# Patient Record
Sex: MALE | Race: WHITE | NOT HISPANIC OR LATINO | URBAN - METROPOLITAN AREA
[De-identification: names, ages, dates, MRNs, and addresses within clinical notes are randomized per-mention and may not be internally consistent; named-entity substitution may affect disease eponyms.]

---

## 2017-01-24 ENCOUNTER — EMERGENCY (EMERGENCY)
Facility: HOSPITAL | Age: 32
LOS: 1 days | Discharge: HOME | End: 2017-01-24
Attending: EMERGENCY MEDICINE | Admitting: EMERGENCY MEDICINE
Payer: SELF-PAY

## 2017-01-24 VITALS
OXYGEN SATURATION: 97 % | HEIGHT: 66 IN | DIASTOLIC BLOOD PRESSURE: 73 MMHG | TEMPERATURE: 98 F | WEIGHT: 178.57 LBS | SYSTOLIC BLOOD PRESSURE: 119 MMHG | RESPIRATION RATE: 17 BRPM | HEART RATE: 76 BPM

## 2017-01-24 DIAGNOSIS — S49.91XA UNSPECIFIED INJURY OF RIGHT SHOULDER AND UPPER ARM, INITIAL ENCOUNTER: ICD-10-CM
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
## 2017-01-24 PROCEDURE — 73030 X-RAY EXAM OF SHOULDER: CPT | Mod: 26,RT

## 2017-01-24 PROCEDURE — 99284 EMERGENCY DEPT VISIT MOD MDM: CPT

## 2017-01-24 PROCEDURE — 71020: CPT | Mod: 26

## 2017-01-24 PROCEDURE — 71046 X-RAY EXAM CHEST 2 VIEWS: CPT

## 2017-01-24 PROCEDURE — 73030 X-RAY EXAM OF SHOULDER: CPT

## 2017-01-24 NOTE — ED PROCEDURE NOTE - NS ED PERI NEURO NEG
Pre-application: Motor, sensory, and vascular responses intact in the injured extremity./Post-application: Motor, sensory, and vascular responses intact in the injured extremity./The patient/caregiver verbalized understanding of how to care for the injured extremity with splint

## 2017-01-24 NOTE — ED PROVIDER NOTE - OBJECTIVE STATEMENT
30 yo M p/w ~ 1 week ago at work was doing a lot of heavy lifting, then co R shoulder / axilla pain, worse with movement of arm. No fall / direct trauma. no numb/ting/focal weak. no redness/ swelling. no fever/chills. no neck / back pain. No other inj or co. No recent cp/sob/palp / arellano.

## 2017-01-24 NOTE — ED PROVIDER NOTE - PHYSICAL EXAMINATION
R shoulder: pos pain with forced abduction of shoulder. Pos tend to axilla and ant lat R chest wall. No erythema. No swelling. Nl dist str/sens equal bl, 2+ pulses. Nl cap refill. FROM passive, from active with mild pain  No other signs of trauma.

## 2017-01-24 NOTE — ED PROCEDURE NOTE - NS ED PERI VASCULAR NEG
no paresthesia/fingers/toes warm to touch/no cyanosis of extremity/capillary refill time < 2 seconds/no swelling

## 2017-08-11 ENCOUNTER — EMERGENCY (EMERGENCY)
Facility: HOSPITAL | Age: 32
LOS: 1 days | Discharge: HOME | End: 2017-08-11
Attending: INTERNAL MEDICINE | Admitting: INTERNAL MEDICINE
Payer: SELF-PAY

## 2017-08-11 VITALS
HEIGHT: 66 IN | DIASTOLIC BLOOD PRESSURE: 85 MMHG | OXYGEN SATURATION: 97 % | TEMPERATURE: 98 F | SYSTOLIC BLOOD PRESSURE: 126 MMHG | RESPIRATION RATE: 13 BRPM | HEART RATE: 89 BPM | WEIGHT: 195.11 LBS

## 2017-08-11 DIAGNOSIS — S61.011A LACERATION WITHOUT FOREIGN BODY OF RIGHT THUMB WITHOUT DAMAGE TO NAIL, INITIAL ENCOUNTER: ICD-10-CM
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
## 2017-08-11 PROCEDURE — 99283 EMERGENCY DEPT VISIT LOW MDM: CPT | Mod: 25

## 2017-08-11 PROCEDURE — 90471 IMMUNIZATION ADMIN: CPT

## 2017-08-11 PROCEDURE — 99053 MED SERV 10PM-8AM 24 HR FAC: CPT

## 2017-08-11 PROCEDURE — 90715 TDAP VACCINE 7 YRS/> IM: CPT

## 2017-08-11 RX ORDER — TETANUS TOXOID, REDUCED DIPHTHERIA TOXOID AND ACELLULAR PERTUSSIS VACCINE, ADSORBED 5; 2.5; 8; 8; 2.5 [IU]/.5ML; [IU]/.5ML; UG/.5ML; UG/.5ML; UG/.5ML
0.5 SUSPENSION INTRAMUSCULAR ONCE
Qty: 0 | Refills: 0 | Status: COMPLETED | OUTPATIENT
Start: 2017-08-11 | End: 2017-08-11

## 2017-08-11 RX ADMIN — TETANUS TOXOID, REDUCED DIPHTHERIA TOXOID AND ACELLULAR PERTUSSIS VACCINE, ADSORBED 0.5 MILLILITER(S): 5; 2.5; 8; 8; 2.5 SUSPENSION INTRAMUSCULAR at 05:29

## 2017-09-12 ENCOUNTER — EMERGENCY (EMERGENCY)
Facility: HOSPITAL | Age: 32
LOS: 1 days | Discharge: HOME | End: 2017-09-12
Attending: EMERGENCY MEDICINE | Admitting: EMERGENCY MEDICINE
Payer: SELF-PAY

## 2017-09-12 VITALS — WEIGHT: 195.11 LBS | HEIGHT: 62 IN

## 2017-09-12 VITALS
TEMPERATURE: 98 F | SYSTOLIC BLOOD PRESSURE: 106 MMHG | HEART RATE: 64 BPM | OXYGEN SATURATION: 100 % | DIASTOLIC BLOOD PRESSURE: 71 MMHG | RESPIRATION RATE: 17 BRPM

## 2017-09-12 DIAGNOSIS — N50.811 RIGHT TESTICULAR PAIN: ICD-10-CM

## 2017-09-12 DIAGNOSIS — N13.5 CROSSING VESSEL AND STRICTURE OF URETER WITHOUT HYDRONEPHROSIS: ICD-10-CM

## 2017-09-12 LAB
ALBUMIN SERPL ELPH-MCNC: 3.9 G/DL — SIGNIFICANT CHANGE UP (ref 3.3–5)
ALP SERPL-CCNC: 66 U/L — SIGNIFICANT CHANGE UP (ref 40–120)
ALT FLD-CCNC: 28 U/L — SIGNIFICANT CHANGE UP (ref 12–78)
ANION GAP SERPL CALC-SCNC: 10 MMOL/L — SIGNIFICANT CHANGE UP (ref 5–17)
APPEARANCE UR: CLEAR — SIGNIFICANT CHANGE UP
AST SERPL-CCNC: 22 U/L — SIGNIFICANT CHANGE UP (ref 15–37)
BACTERIA # UR AUTO: ABNORMAL
BASOPHILS # BLD AUTO: 0.1 K/UL — SIGNIFICANT CHANGE UP (ref 0–0.2)
BASOPHILS NFR BLD AUTO: 1.1 % — SIGNIFICANT CHANGE UP (ref 0–2)
BILIRUB SERPL-MCNC: 0.2 MG/DL — SIGNIFICANT CHANGE UP (ref 0.2–1.2)
BILIRUB UR-MCNC: NEGATIVE — SIGNIFICANT CHANGE UP
BUN SERPL-MCNC: 12 MG/DL — SIGNIFICANT CHANGE UP (ref 7–23)
CALCIUM SERPL-MCNC: 9.3 MG/DL — SIGNIFICANT CHANGE UP (ref 8.5–10.1)
CHLORIDE SERPL-SCNC: 104 MMOL/L — SIGNIFICANT CHANGE UP (ref 96–108)
CO2 SERPL-SCNC: 27 MMOL/L — SIGNIFICANT CHANGE UP (ref 22–31)
COLOR SPEC: YELLOW — SIGNIFICANT CHANGE UP
COMMENT - URINE: SIGNIFICANT CHANGE UP
CREAT SERPL-MCNC: 0.96 MG/DL — SIGNIFICANT CHANGE UP (ref 0.5–1.3)
DIFF PNL FLD: ABNORMAL
EOSINOPHIL # BLD AUTO: 0.1 K/UL — SIGNIFICANT CHANGE UP (ref 0–0.5)
EOSINOPHIL NFR BLD AUTO: 1.2 % — SIGNIFICANT CHANGE UP (ref 0–6)
EPI CELLS # UR: SIGNIFICANT CHANGE UP
GLUCOSE SERPL-MCNC: 88 MG/DL — SIGNIFICANT CHANGE UP (ref 70–99)
GLUCOSE UR QL: NEGATIVE — SIGNIFICANT CHANGE UP
HCT VFR BLD CALC: 43.4 % — SIGNIFICANT CHANGE UP (ref 39–50)
HGB BLD-MCNC: 14.8 G/DL — SIGNIFICANT CHANGE UP (ref 13–17)
KETONES UR-MCNC: NEGATIVE — SIGNIFICANT CHANGE UP
LEUKOCYTE ESTERASE UR-ACNC: NEGATIVE — SIGNIFICANT CHANGE UP
LYMPHOCYTES # BLD AUTO: 3.2 K/UL — SIGNIFICANT CHANGE UP (ref 1–3.3)
LYMPHOCYTES # BLD AUTO: 37.1 % — SIGNIFICANT CHANGE UP (ref 13–44)
MCHC RBC-ENTMCNC: 32.1 PG — SIGNIFICANT CHANGE UP (ref 27–34)
MCHC RBC-ENTMCNC: 34.1 GM/DL — SIGNIFICANT CHANGE UP (ref 32–36)
MCV RBC AUTO: 94.1 FL — SIGNIFICANT CHANGE UP (ref 80–100)
MONOCYTES # BLD AUTO: 0.9 K/UL — SIGNIFICANT CHANGE UP (ref 0–0.9)
MONOCYTES NFR BLD AUTO: 10.1 % — HIGH (ref 1–9)
NEUTROPHILS # BLD AUTO: 4.3 K/UL — SIGNIFICANT CHANGE UP (ref 1.8–7.4)
NEUTROPHILS NFR BLD AUTO: 50.4 % — SIGNIFICANT CHANGE UP (ref 43–77)
NITRITE UR-MCNC: NEGATIVE — SIGNIFICANT CHANGE UP
PH UR: 6.5 — SIGNIFICANT CHANGE UP (ref 5–8)
PLATELET # BLD AUTO: 359 K/UL — SIGNIFICANT CHANGE UP (ref 150–400)
POTASSIUM SERPL-MCNC: 4.2 MMOL/L — SIGNIFICANT CHANGE UP (ref 3.5–5.3)
POTASSIUM SERPL-SCNC: 4.2 MMOL/L — SIGNIFICANT CHANGE UP (ref 3.5–5.3)
PROT SERPL-MCNC: 7.3 G/DL — SIGNIFICANT CHANGE UP (ref 6–8.3)
PROT UR-MCNC: NEGATIVE — SIGNIFICANT CHANGE UP
RBC # BLD: 4.61 M/UL — SIGNIFICANT CHANGE UP (ref 4.2–5.8)
RBC # FLD: 11.6 % — SIGNIFICANT CHANGE UP (ref 10.3–14.5)
RBC CASTS # UR COMP ASSIST: SIGNIFICANT CHANGE UP /HPF (ref 0–4)
SODIUM SERPL-SCNC: 141 MMOL/L — SIGNIFICANT CHANGE UP (ref 135–145)
SP GR SPEC: 1.01 — SIGNIFICANT CHANGE UP (ref 1.01–1.02)
UROBILINOGEN FLD QL: NEGATIVE — SIGNIFICANT CHANGE UP
WBC # BLD: 8.6 K/UL — SIGNIFICANT CHANGE UP (ref 3.8–10.5)
WBC # FLD AUTO: 8.6 K/UL — SIGNIFICANT CHANGE UP (ref 3.8–10.5)
WBC UR QL: SIGNIFICANT CHANGE UP

## 2017-09-12 PROCEDURE — 99284 EMERGENCY DEPT VISIT MOD MDM: CPT | Mod: 25

## 2017-09-12 PROCEDURE — 96361 HYDRATE IV INFUSION ADD-ON: CPT

## 2017-09-12 PROCEDURE — 76870 US EXAM SCROTUM: CPT | Mod: 26

## 2017-09-12 PROCEDURE — 87086 URINE CULTURE/COLONY COUNT: CPT

## 2017-09-12 PROCEDURE — 74177 CT ABD & PELVIS W/CONTRAST: CPT

## 2017-09-12 PROCEDURE — 74177 CT ABD & PELVIS W/CONTRAST: CPT | Mod: 26

## 2017-09-12 PROCEDURE — 99285 EMERGENCY DEPT VISIT HI MDM: CPT | Mod: 25

## 2017-09-12 PROCEDURE — 99053 MED SERV 10PM-8AM 24 HR FAC: CPT

## 2017-09-12 PROCEDURE — 80053 COMPREHEN METABOLIC PANEL: CPT

## 2017-09-12 PROCEDURE — 85027 COMPLETE CBC AUTOMATED: CPT

## 2017-09-12 PROCEDURE — 96375 TX/PRO/DX INJ NEW DRUG ADDON: CPT

## 2017-09-12 PROCEDURE — 96374 THER/PROPH/DIAG INJ IV PUSH: CPT

## 2017-09-12 PROCEDURE — 81001 URINALYSIS AUTO W/SCOPE: CPT

## 2017-09-12 PROCEDURE — 76870 US EXAM SCROTUM: CPT

## 2017-09-12 PROCEDURE — 96376 TX/PRO/DX INJ SAME DRUG ADON: CPT

## 2017-09-12 RX ORDER — MORPHINE SULFATE 50 MG/1
4 CAPSULE, EXTENDED RELEASE ORAL ONCE
Qty: 0 | Refills: 0 | Status: DISCONTINUED | OUTPATIENT
Start: 2017-09-12 | End: 2017-09-12

## 2017-09-12 RX ORDER — SODIUM CHLORIDE 9 MG/ML
1000 INJECTION INTRAMUSCULAR; INTRAVENOUS; SUBCUTANEOUS ONCE
Qty: 0 | Refills: 0 | Status: COMPLETED | OUTPATIENT
Start: 2017-09-12 | End: 2017-09-12

## 2017-09-12 RX ORDER — KETOROLAC TROMETHAMINE 30 MG/ML
30 SYRINGE (ML) INJECTION ONCE
Qty: 0 | Refills: 0 | Status: DISCONTINUED | OUTPATIENT
Start: 2017-09-12 | End: 2017-09-12

## 2017-09-12 RX ORDER — IOHEXOL 300 MG/ML
30 INJECTION, SOLUTION INTRAVENOUS ONCE
Qty: 0 | Refills: 0 | Status: COMPLETED | OUTPATIENT
Start: 2017-09-12 | End: 2017-09-12

## 2017-09-12 RX ADMIN — MORPHINE SULFATE 4 MILLIGRAM(S): 50 CAPSULE, EXTENDED RELEASE ORAL at 11:08

## 2017-09-12 RX ADMIN — Medication 30 MILLIGRAM(S): at 14:22

## 2017-09-12 RX ADMIN — Medication 30 MILLIGRAM(S): at 08:21

## 2017-09-12 RX ADMIN — MORPHINE SULFATE 4 MILLIGRAM(S): 50 CAPSULE, EXTENDED RELEASE ORAL at 12:00

## 2017-09-12 RX ADMIN — SODIUM CHLORIDE 1000 MILLILITER(S): 9 INJECTION INTRAMUSCULAR; INTRAVENOUS; SUBCUTANEOUS at 08:21

## 2017-09-12 RX ADMIN — IOHEXOL 30 MILLILITER(S): 300 INJECTION, SOLUTION INTRAVENOUS at 11:08

## 2017-09-12 RX ADMIN — Medication 30 MILLIGRAM(S): at 09:15

## 2017-09-12 NOTE — ED PROVIDER NOTE - OBJECTIVE STATEMENT
pt c/o right testicular pain radiating to rlq since yesterday. no fevers, chills, ha, d/n/v, diarrhea, constipation, dysuria, hematuria, frequency, injury, penile d/c or lesion or any new sexual partners.  pmd  - alina

## 2017-09-12 NOTE — ED ADULT NURSE NOTE - NS ED NURSE DC INFO COMPLEXITY
Complex: Multiple Rx/Tx. Pt has difficulty understanding. Requires additional help/Verbalized Understanding

## 2017-09-12 NOTE — ED ADULT NURSE NOTE - OBJECTIVE STATEMENT
Pt received in bed alert and oriented and resting in bed with the c/o RLQ abd pain and right testicular pain x 2 days. As per Md's orders IV julita placed blood specimen obtained and sent to the lab. Pt stable and nursing care ongoing and safety maintained, pt now awaiting US.

## 2017-09-12 NOTE — ED PROVIDER NOTE - CARE PLAN
Principal Discharge DX:	Testicular pain, right Principal Discharge DX:	Testicular pain, right  Secondary Diagnosis:	UPJ (ureteropelvic junction) obstruction

## 2017-09-12 NOTE — ED PROVIDER NOTE - CHPI ED SYMPTOMS NEG
no chills/no vomiting/no diarrhea/no fever/no blood in stool/no nausea/no dysuria/no abdominal distension/no burning urination/no hematuria

## 2017-09-13 LAB
CULTURE RESULTS: NO GROWTH — SIGNIFICANT CHANGE UP
SPECIMEN SOURCE: SIGNIFICANT CHANGE UP

## 2017-12-20 ENCOUNTER — TRANSCRIPTION ENCOUNTER (OUTPATIENT)
Age: 32
End: 2017-12-20

## 2018-01-04 ENCOUNTER — TRANSCRIPTION ENCOUNTER (OUTPATIENT)
Age: 33
End: 2018-01-04

## 2018-01-30 ENCOUNTER — INPATIENT (INPATIENT)
Facility: HOSPITAL | Age: 33
LOS: 6 days | Discharge: HOME | End: 2018-02-06
Attending: PSYCHIATRY & NEUROLOGY | Admitting: PSYCHIATRY & NEUROLOGY
Payer: COMMERCIAL

## 2018-01-30 ENCOUNTER — OUTPATIENT (OUTPATIENT)
Dept: OUTPATIENT SERVICES | Facility: HOSPITAL | Age: 33
LOS: 1 days | Discharge: STILL ADMITTED OR ADMINISTRATIVE | End: 2018-01-30

## 2018-01-30 VITALS — RESPIRATION RATE: 18 BRPM | HEIGHT: 66 IN | WEIGHT: 194.01 LBS | TEMPERATURE: 98 F | OXYGEN SATURATION: 98 %

## 2018-01-30 DIAGNOSIS — F32.9 MAJOR DEPRESSIVE DISORDER, SINGLE EPISODE, UNSPECIFIED: ICD-10-CM

## 2018-01-30 LAB
ALBUMIN SERPL ELPH-MCNC: 4.8 G/DL — SIGNIFICANT CHANGE UP (ref 3.3–5)
ALP SERPL-CCNC: 59 U/L — SIGNIFICANT CHANGE UP (ref 40–120)
ALT FLD-CCNC: 25 U/L — SIGNIFICANT CHANGE UP (ref 4–41)
APAP SERPL-MCNC: < 15 UG/ML — LOW (ref 15–25)
APPEARANCE UR: CLEAR — SIGNIFICANT CHANGE UP
AST SERPL-CCNC: 24 U/L — SIGNIFICANT CHANGE UP (ref 4–40)
BARBITURATES MEASUREMENT: NEGATIVE — SIGNIFICANT CHANGE UP
BASOPHILS # BLD AUTO: 0.03 K/UL — SIGNIFICANT CHANGE UP (ref 0–0.2)
BASOPHILS NFR BLD AUTO: 0.3 % — SIGNIFICANT CHANGE UP (ref 0–2)
BENZODIAZ SERPL-MCNC: NEGATIVE — SIGNIFICANT CHANGE UP
BILIRUB SERPL-MCNC: 0.4 MG/DL — SIGNIFICANT CHANGE UP (ref 0.2–1.2)
BILIRUB UR-MCNC: NEGATIVE — SIGNIFICANT CHANGE UP
BLOOD UR QL VISUAL: NEGATIVE — SIGNIFICANT CHANGE UP
BUN SERPL-MCNC: 11 MG/DL — SIGNIFICANT CHANGE UP (ref 7–23)
CALCIUM SERPL-MCNC: 9.3 MG/DL — SIGNIFICANT CHANGE UP (ref 8.4–10.5)
CHLORIDE SERPL-SCNC: 99 MMOL/L — SIGNIFICANT CHANGE UP (ref 98–107)
CHOLEST SERPL-MCNC: 193 MG/DL — SIGNIFICANT CHANGE UP (ref 120–199)
CO2 SERPL-SCNC: 29 MMOL/L — SIGNIFICANT CHANGE UP (ref 22–31)
COLOR SPEC: YELLOW — SIGNIFICANT CHANGE UP
CREAT SERPL-MCNC: 1.01 MG/DL — SIGNIFICANT CHANGE UP (ref 0.5–1.3)
EOSINOPHIL # BLD AUTO: 0.06 K/UL — SIGNIFICANT CHANGE UP (ref 0–0.5)
EOSINOPHIL NFR BLD AUTO: 0.6 % — SIGNIFICANT CHANGE UP (ref 0–6)
ETHANOL BLD-MCNC: < 10 MG/DL — SIGNIFICANT CHANGE UP
FOLATE SERPL-MCNC: 14.9 NG/ML — SIGNIFICANT CHANGE UP (ref 4.7–20)
GLUCOSE SERPL-MCNC: 84 MG/DL — SIGNIFICANT CHANGE UP (ref 70–99)
GLUCOSE UR-MCNC: NEGATIVE — SIGNIFICANT CHANGE UP
HBA1C BLD-MCNC: 5.3 % — SIGNIFICANT CHANGE UP (ref 4–5.6)
HCT VFR BLD CALC: 46.6 % — SIGNIFICANT CHANGE UP (ref 39–50)
HDLC SERPL-MCNC: 60 MG/DL — HIGH (ref 35–55)
HGB BLD-MCNC: 15.5 G/DL — SIGNIFICANT CHANGE UP (ref 13–17)
IMM GRANULOCYTES # BLD AUTO: 0.03 # — SIGNIFICANT CHANGE UP
IMM GRANULOCYTES NFR BLD AUTO: 0.3 % — SIGNIFICANT CHANGE UP (ref 0–1.5)
KETONES UR-MCNC: SIGNIFICANT CHANGE UP
LEUKOCYTE ESTERASE UR-ACNC: NEGATIVE — SIGNIFICANT CHANGE UP
LIPID PNL WITH DIRECT LDL SERPL: 129 MG/DL — SIGNIFICANT CHANGE UP
LYMPHOCYTES # BLD AUTO: 2.69 K/UL — SIGNIFICANT CHANGE UP (ref 1–3.3)
LYMPHOCYTES # BLD AUTO: 29.1 % — SIGNIFICANT CHANGE UP (ref 13–44)
MCHC RBC-ENTMCNC: 31.3 PG — SIGNIFICANT CHANGE UP (ref 27–34)
MCHC RBC-ENTMCNC: 33.3 % — SIGNIFICANT CHANGE UP (ref 32–36)
MCV RBC AUTO: 94.1 FL — SIGNIFICANT CHANGE UP (ref 80–100)
MONOCYTES # BLD AUTO: 0.99 K/UL — HIGH (ref 0–0.9)
MONOCYTES NFR BLD AUTO: 10.7 % — SIGNIFICANT CHANGE UP (ref 2–14)
MUCOUS THREADS # UR AUTO: SIGNIFICANT CHANGE UP
NEUTROPHILS # BLD AUTO: 5.44 K/UL — SIGNIFICANT CHANGE UP (ref 1.8–7.4)
NEUTROPHILS NFR BLD AUTO: 59 % — SIGNIFICANT CHANGE UP (ref 43–77)
NITRITE UR-MCNC: NEGATIVE — SIGNIFICANT CHANGE UP
NON-SQ EPI CELLS # UR AUTO: <1 — SIGNIFICANT CHANGE UP
NRBC # FLD: 0 — SIGNIFICANT CHANGE UP
PH UR: 6.5 — SIGNIFICANT CHANGE UP (ref 4.6–8)
PLATELET # BLD AUTO: 338 K/UL — SIGNIFICANT CHANGE UP (ref 150–400)
PMV BLD: 9.3 FL — SIGNIFICANT CHANGE UP (ref 7–13)
POTASSIUM SERPL-MCNC: 4.2 MMOL/L — SIGNIFICANT CHANGE UP (ref 3.5–5.3)
POTASSIUM SERPL-SCNC: 4.2 MMOL/L — SIGNIFICANT CHANGE UP (ref 3.5–5.3)
PROT SERPL-MCNC: 7.4 G/DL — SIGNIFICANT CHANGE UP (ref 6–8.3)
PROT UR-MCNC: 10 MG/DL — SIGNIFICANT CHANGE UP
RBC # BLD: 4.95 M/UL — SIGNIFICANT CHANGE UP (ref 4.2–5.8)
RBC # FLD: 13.3 % — SIGNIFICANT CHANGE UP (ref 10.3–14.5)
RBC CASTS # UR COMP ASSIST: SIGNIFICANT CHANGE UP (ref 0–?)
SALICYLATES SERPL-MCNC: < 5 MG/DL — LOW (ref 15–30)
SODIUM SERPL-SCNC: 141 MMOL/L — SIGNIFICANT CHANGE UP (ref 135–145)
SP GR SPEC: 1.02 — SIGNIFICANT CHANGE UP (ref 1–1.04)
SQUAMOUS # UR AUTO: SIGNIFICANT CHANGE UP
TRIGL SERPL-MCNC: 80 MG/DL — SIGNIFICANT CHANGE UP (ref 10–149)
TSH SERPL-MCNC: 0.68 UIU/ML — SIGNIFICANT CHANGE UP (ref 0.27–4.2)
UROBILINOGEN FLD QL: NORMAL MG/DL — SIGNIFICANT CHANGE UP
VIT B12 SERPL-MCNC: 265 PG/ML — SIGNIFICANT CHANGE UP (ref 200–900)
WBC # BLD: 9.24 K/UL — SIGNIFICANT CHANGE UP (ref 3.8–10.5)
WBC # FLD AUTO: 9.24 K/UL — SIGNIFICANT CHANGE UP (ref 3.8–10.5)
WBC UR QL: SIGNIFICANT CHANGE UP (ref 0–?)

## 2018-01-31 DIAGNOSIS — F33.9 MAJOR DEPRESSIVE DISORDER, RECURRENT, UNSPECIFIED: ICD-10-CM

## 2018-01-31 PROCEDURE — 99232 SBSQ HOSP IP/OBS MODERATE 35: CPT

## 2018-01-31 RX ORDER — DIPHENHYDRAMINE HCL 50 MG
50 CAPSULE ORAL EVERY 6 HOURS
Qty: 0 | Refills: 0 | Status: DISCONTINUED | OUTPATIENT
Start: 2018-01-31 | End: 2018-02-06

## 2018-01-31 RX ORDER — THIAMINE MONONITRATE (VIT B1) 100 MG
100 TABLET ORAL DAILY
Qty: 0 | Refills: 0 | Status: COMPLETED | OUTPATIENT
Start: 2018-01-31 | End: 2018-02-02

## 2018-01-31 RX ORDER — HYDROXYZINE HCL 10 MG
50 TABLET ORAL EVERY 6 HOURS
Qty: 0 | Refills: 0 | Status: DISCONTINUED | OUTPATIENT
Start: 2018-01-31 | End: 2018-02-06

## 2018-01-31 RX ORDER — TRAZODONE HCL 50 MG
50 TABLET ORAL ONCE
Qty: 0 | Refills: 0 | Status: COMPLETED | OUTPATIENT
Start: 2018-01-31 | End: 2018-01-31

## 2018-01-31 RX ORDER — FOLIC ACID 0.8 MG
1 TABLET ORAL DAILY
Qty: 0 | Refills: 0 | Status: COMPLETED | OUTPATIENT
Start: 2018-01-31 | End: 2018-02-06

## 2018-01-31 RX ORDER — DIPHENHYDRAMINE HCL 50 MG
50 CAPSULE ORAL ONCE
Qty: 0 | Refills: 0 | Status: DISCONTINUED | OUTPATIENT
Start: 2018-01-31 | End: 2018-02-06

## 2018-01-31 RX ORDER — HYDROXYZINE HCL 10 MG
50 TABLET ORAL EVERY 6 HOURS
Qty: 0 | Refills: 0 | Status: DISCONTINUED | OUTPATIENT
Start: 2018-01-31 | End: 2018-01-31

## 2018-01-31 RX ORDER — SERTRALINE 25 MG/1
75 TABLET, FILM COATED ORAL DAILY
Qty: 0 | Refills: 0 | Status: DISCONTINUED | OUTPATIENT
Start: 2018-02-01 | End: 2018-02-01

## 2018-01-31 RX ORDER — NICOTINE POLACRILEX 2 MG
1 GUM BUCCAL DAILY
Qty: 0 | Refills: 0 | Status: DISCONTINUED | OUTPATIENT
Start: 2018-01-31 | End: 2018-02-06

## 2018-01-31 RX ORDER — LANOLIN ALCOHOL/MO/W.PET/CERES
3 CREAM (GRAM) TOPICAL AT BEDTIME
Qty: 0 | Refills: 0 | Status: DISCONTINUED | OUTPATIENT
Start: 2018-01-31 | End: 2018-01-31

## 2018-01-31 RX ORDER — SERTRALINE 25 MG/1
50 TABLET, FILM COATED ORAL ONCE
Qty: 0 | Refills: 0 | Status: COMPLETED | OUTPATIENT
Start: 2018-01-31 | End: 2018-01-31

## 2018-01-31 RX ORDER — LAMOTRIGINE 25 MG/1
25 TABLET, ORALLY DISINTEGRATING ORAL ONCE
Qty: 0 | Refills: 0 | Status: COMPLETED | OUTPATIENT
Start: 2018-01-31 | End: 2018-01-31

## 2018-01-31 RX ORDER — NICOTINE POLACRILEX 2 MG
2 GUM BUCCAL
Qty: 0 | Refills: 0 | Status: DISCONTINUED | OUTPATIENT
Start: 2018-01-31 | End: 2018-02-06

## 2018-01-31 RX ORDER — LAMOTRIGINE 25 MG/1
25 TABLET, ORALLY DISINTEGRATING ORAL DAILY
Qty: 0 | Refills: 0 | Status: DISCONTINUED | OUTPATIENT
Start: 2018-02-01 | End: 2018-02-06

## 2018-01-31 RX ORDER — SERTRALINE 25 MG/1
75 TABLET, FILM COATED ORAL DAILY
Qty: 0 | Refills: 0 | Status: DISCONTINUED | OUTPATIENT
Start: 2018-01-31 | End: 2018-01-31

## 2018-01-31 RX ORDER — LANOLIN ALCOHOL/MO/W.PET/CERES
3 CREAM (GRAM) TOPICAL AT BEDTIME
Qty: 0 | Refills: 0 | Status: DISCONTINUED | OUTPATIENT
Start: 2018-01-31 | End: 2018-02-06

## 2018-01-31 RX ADMIN — Medication 2 MILLIGRAM(S): at 21:52

## 2018-01-31 RX ADMIN — Medication 2 MILLIGRAM(S): at 13:32

## 2018-01-31 RX ADMIN — Medication 1 MILLIGRAM(S): at 09:23

## 2018-01-31 RX ADMIN — Medication 50 MILLIGRAM(S): at 01:08

## 2018-01-31 RX ADMIN — LAMOTRIGINE 25 MILLIGRAM(S): 25 TABLET, ORALLY DISINTEGRATING ORAL at 12:44

## 2018-01-31 RX ADMIN — Medication 100 MILLIGRAM(S): at 09:23

## 2018-01-31 RX ADMIN — Medication 3 MILLIGRAM(S): at 01:08

## 2018-01-31 RX ADMIN — Medication 1 TABLET(S): at 09:23

## 2018-01-31 RX ADMIN — Medication 1 PATCH: at 13:32

## 2018-01-31 RX ADMIN — SERTRALINE 50 MILLIGRAM(S): 25 TABLET, FILM COATED ORAL at 12:44

## 2018-01-31 RX ADMIN — Medication 3 MILLIGRAM(S): at 21:12

## 2018-01-31 NOTE — CHART NOTE - NSCHARTNOTEFT_GEN_A_CORE
Screening Medical Evaluation  Patient Admitted from: Crisis Center    [facility] admitting diagnosis:    PAST MEDICAL & SURGICAL HISTORY:  No pertinent past medical history        Allergies    Quadrinal (Unknown)    Intolerances        Social History:     FAMILY HISTORY:      MEDICATIONS  (STANDING):  folic acid 1 milliGRAM(s) Oral daily  multivitamin 1 Tablet(s) Oral daily  thiamine 100 milliGRAM(s) Oral daily    MEDICATIONS  (PRN):  diphenhydrAMINE   Capsule 50 milliGRAM(s) Oral every 6 hours PRN agitation  diphenhydrAMINE   Injectable 50 milliGRAM(s) IntraMuscular once PRN agitation  hydrOXYzine hydrochloride 50 milliGRAM(s) Oral every 6 hours PRN Anxiety  LORazepam     Tablet 2 milliGRAM(s) Oral every 2 hours PRN CIWA score increase by 2 points and current CIWA score GREATER THAN 9  LORazepam   Injectable 2 milliGRAM(s) IntraMuscular once PRN Agitation  melatonin. 3 milliGRAM(s) Oral at bedtime PRN Insomnia      Vital Signs Last 24 Hrs  T(C): 36.7 (2018 21:58), Max: 36.7 (2018 21:58)  T(F): 98 (2018 21:58), Max: 98 (2018 21:58)  HR: 61  BP: 117/74  BP(mean): --  RR: 16 (2018 01:09) (16 - 18)  SpO2: 98% (2018 21:58) (98% - 98%)  CAPILLARY BLOOD GLUCOSE            PHYSICAL EXAM:  GENERAL: NAD, well-developed  HEAD:  Atraumatic, Normocephalic  EYES: EOMI, PERRLA, conjunctiva and sclera clear  NECK: Supple.  CHEST/LUNG: Clear to auscultation bilaterally; No wheeze  HEART: Regular rate and rhythm; +s1 and +s2; No murmurs, rubs, or gallops  ABDOMEN: Soft, Nontender, Nondistended; Normoactive Bowel sounds present  EXTREMITIES:  2+ Peripheral Pulses, No clubbing, cyanosis, or edema  PSYCH: AAOx3  NEUROLOGY: non-focal  SKIN: No rashes or lesions    LABS:                        15.5   9.24  )-----------( 338      ( 2018 20:33 )             46.6     01-30    141  |  99  |  11  ----------------------------<  84  4.2   |  29  |  1.01    Ca    9.3      2018 20:33    TPro  7.4  /  Alb  4.8  /  TBili  0.4  /  DBili  x   /  AST  24  /  ALT  25  /  AlkPhos  59  -          Urinalysis Basic - ( 2018 20:33 )    Color: YELLOW / Appearance: CLEAR / S.024 / pH: 6.5  Gluc: NEGATIVE / Ketone: TRACE  / Bili: NEGATIVE / Urobili: NORMAL mg/dL   Blood: NEGATIVE / Protein: 10 mg/dL / Nitrite: NEGATIVE   Leuk Esterase: NEGATIVE / RBC: 0-2 / WBC 0-2   Sq Epi: OCC / Non Sq Epi: x / Bacteria: x        RADIOLOGY & ADDITIONAL TESTS:    Assessment and Plan:  32 year old male presenting from Crisis Center to [facility] with admitting diagnosis of Major depressive disorder with single episode with no pertinent PMH. Pt denies any medical concerns at this time. Denies fever, chills, headache, SOB, chest pain, abdominal pain, N/V/D/C, dysuria.  1) Major depressive disorder with single episode: follow care plan as per primary psych team.

## 2018-02-01 PROCEDURE — 99232 SBSQ HOSP IP/OBS MODERATE 35: CPT

## 2018-02-01 RX ORDER — SERTRALINE 25 MG/1
100 TABLET, FILM COATED ORAL DAILY
Qty: 0 | Refills: 0 | Status: DISCONTINUED | OUTPATIENT
Start: 2018-02-02 | End: 2018-02-06

## 2018-02-01 RX ORDER — TRAZODONE HCL 50 MG
50 TABLET ORAL AT BEDTIME
Qty: 0 | Refills: 0 | Status: DISCONTINUED | OUTPATIENT
Start: 2018-02-01 | End: 2018-02-04

## 2018-02-01 RX ADMIN — Medication 1 MILLIGRAM(S): at 09:09

## 2018-02-01 RX ADMIN — Medication 50 MILLIGRAM(S): at 21:07

## 2018-02-01 RX ADMIN — Medication 1 TABLET(S): at 09:09

## 2018-02-01 RX ADMIN — Medication 100 MILLIGRAM(S): at 09:09

## 2018-02-01 RX ADMIN — SERTRALINE 75 MILLIGRAM(S): 25 TABLET, FILM COATED ORAL at 09:09

## 2018-02-01 RX ADMIN — Medication 1 PATCH: at 09:09

## 2018-02-01 RX ADMIN — LAMOTRIGINE 25 MILLIGRAM(S): 25 TABLET, ORALLY DISINTEGRATING ORAL at 09:09

## 2018-02-01 RX ADMIN — Medication 3 MILLIGRAM(S): at 21:06

## 2018-02-01 RX ADMIN — Medication 2 MILLIGRAM(S): at 21:07

## 2018-02-01 RX ADMIN — Medication 2 MILLIGRAM(S): at 02:36

## 2018-02-02 PROCEDURE — 99232 SBSQ HOSP IP/OBS MODERATE 35: CPT

## 2018-02-02 RX ORDER — MIRTAZAPINE 45 MG/1
7.5 TABLET, ORALLY DISINTEGRATING ORAL AT BEDTIME
Qty: 0 | Refills: 0 | Status: DISCONTINUED | OUTPATIENT
Start: 2018-02-02 | End: 2018-02-06

## 2018-02-02 RX ADMIN — MIRTAZAPINE 7.5 MILLIGRAM(S): 45 TABLET, ORALLY DISINTEGRATING ORAL at 21:47

## 2018-02-02 RX ADMIN — Medication 1 MILLIGRAM(S): at 09:55

## 2018-02-02 RX ADMIN — Medication 1 TABLET(S): at 09:55

## 2018-02-02 RX ADMIN — Medication 1 PATCH: at 09:55

## 2018-02-02 RX ADMIN — Medication 3 MILLIGRAM(S): at 21:47

## 2018-02-02 RX ADMIN — Medication 2 MILLIGRAM(S): at 14:22

## 2018-02-02 RX ADMIN — SERTRALINE 100 MILLIGRAM(S): 25 TABLET, FILM COATED ORAL at 09:55

## 2018-02-02 RX ADMIN — LAMOTRIGINE 25 MILLIGRAM(S): 25 TABLET, ORALLY DISINTEGRATING ORAL at 09:55

## 2018-02-02 RX ADMIN — Medication 100 MILLIGRAM(S): at 09:56

## 2018-02-03 PROCEDURE — 99231 SBSQ HOSP IP/OBS SF/LOW 25: CPT

## 2018-02-03 RX ADMIN — LAMOTRIGINE 25 MILLIGRAM(S): 25 TABLET, ORALLY DISINTEGRATING ORAL at 09:23

## 2018-02-03 RX ADMIN — SERTRALINE 100 MILLIGRAM(S): 25 TABLET, FILM COATED ORAL at 09:23

## 2018-02-03 RX ADMIN — Medication 2 MILLIGRAM(S): at 16:24

## 2018-02-03 RX ADMIN — Medication 1 TABLET(S): at 09:23

## 2018-02-03 RX ADMIN — Medication 1 MILLIGRAM(S): at 09:23

## 2018-02-03 RX ADMIN — Medication 2 MILLIGRAM(S): at 12:41

## 2018-02-03 RX ADMIN — MIRTAZAPINE 7.5 MILLIGRAM(S): 45 TABLET, ORALLY DISINTEGRATING ORAL at 20:44

## 2018-02-03 RX ADMIN — Medication 1 PATCH: at 09:23

## 2018-02-03 RX ADMIN — Medication 3 MILLIGRAM(S): at 20:44

## 2018-02-04 PROCEDURE — 99231 SBSQ HOSP IP/OBS SF/LOW 25: CPT

## 2018-02-04 RX ORDER — TRAZODONE HCL 50 MG
100 TABLET ORAL AT BEDTIME
Qty: 0 | Refills: 0 | Status: DISCONTINUED | OUTPATIENT
Start: 2018-02-04 | End: 2018-02-06

## 2018-02-04 RX ADMIN — Medication 1 TABLET(S): at 09:59

## 2018-02-04 RX ADMIN — Medication 3 MILLIGRAM(S): at 20:38

## 2018-02-04 RX ADMIN — Medication 100 MILLIGRAM(S): at 20:38

## 2018-02-04 RX ADMIN — Medication 2 MILLIGRAM(S): at 11:06

## 2018-02-04 RX ADMIN — LAMOTRIGINE 25 MILLIGRAM(S): 25 TABLET, ORALLY DISINTEGRATING ORAL at 09:59

## 2018-02-04 RX ADMIN — SERTRALINE 100 MILLIGRAM(S): 25 TABLET, FILM COATED ORAL at 09:59

## 2018-02-04 RX ADMIN — Medication 2 MILLIGRAM(S): at 16:42

## 2018-02-04 RX ADMIN — MIRTAZAPINE 7.5 MILLIGRAM(S): 45 TABLET, ORALLY DISINTEGRATING ORAL at 20:38

## 2018-02-04 RX ADMIN — Medication 1 PATCH: at 09:59

## 2018-02-04 RX ADMIN — Medication 1 MILLIGRAM(S): at 09:59

## 2018-02-04 RX ADMIN — Medication 2 MILLIGRAM(S): at 12:47

## 2018-02-04 RX ADMIN — Medication 2 MILLIGRAM(S): at 21:02

## 2018-02-05 PROCEDURE — 99232 SBSQ HOSP IP/OBS MODERATE 35: CPT

## 2018-02-05 RX ORDER — TRAZODONE HCL 50 MG
1 TABLET ORAL
Qty: 14 | Refills: 0 | OUTPATIENT
Start: 2018-02-05 | End: 2018-02-18

## 2018-02-05 RX ORDER — LAMOTRIGINE 25 MG/1
1 TABLET, ORALLY DISINTEGRATING ORAL
Qty: 14 | Refills: 0 | OUTPATIENT
Start: 2018-02-05 | End: 2018-02-18

## 2018-02-05 RX ORDER — SERTRALINE 25 MG/1
1 TABLET, FILM COATED ORAL
Qty: 14 | Refills: 0 | OUTPATIENT
Start: 2018-02-05 | End: 2018-02-18

## 2018-02-05 RX ORDER — MIRTAZAPINE 45 MG/1
1 TABLET, ORALLY DISINTEGRATING ORAL
Qty: 14 | Refills: 0 | OUTPATIENT
Start: 2018-02-05 | End: 2018-02-18

## 2018-02-05 RX ORDER — LANOLIN ALCOHOL/MO/W.PET/CERES
1 CREAM (GRAM) TOPICAL
Qty: 14 | Refills: 0 | OUTPATIENT
Start: 2018-02-05 | End: 2018-02-18

## 2018-02-05 RX ADMIN — Medication 2 MILLIGRAM(S): at 13:29

## 2018-02-05 RX ADMIN — Medication 3 MILLIGRAM(S): at 20:51

## 2018-02-05 RX ADMIN — Medication 100 MILLIGRAM(S): at 20:51

## 2018-02-05 RX ADMIN — MIRTAZAPINE 7.5 MILLIGRAM(S): 45 TABLET, ORALLY DISINTEGRATING ORAL at 20:51

## 2018-02-05 RX ADMIN — Medication 2 MILLIGRAM(S): at 17:30

## 2018-02-05 RX ADMIN — Medication 1 TABLET(S): at 09:19

## 2018-02-05 RX ADMIN — LAMOTRIGINE 25 MILLIGRAM(S): 25 TABLET, ORALLY DISINTEGRATING ORAL at 09:19

## 2018-02-05 RX ADMIN — Medication 1 PATCH: at 09:19

## 2018-02-05 RX ADMIN — Medication 2 MILLIGRAM(S): at 15:16

## 2018-02-05 RX ADMIN — SERTRALINE 100 MILLIGRAM(S): 25 TABLET, FILM COATED ORAL at 09:19

## 2018-02-05 RX ADMIN — Medication 1 MILLIGRAM(S): at 09:19

## 2018-02-06 VITALS — TEMPERATURE: 98 F

## 2018-02-06 PROCEDURE — 99238 HOSP IP/OBS DSCHRG MGMT 30/<: CPT

## 2018-02-06 RX ORDER — MIRTAZAPINE 45 MG/1
1 TABLET, ORALLY DISINTEGRATING ORAL
Qty: 14 | Refills: 0 | OUTPATIENT
Start: 2018-02-06 | End: 2018-02-19

## 2018-02-06 RX ORDER — TRAZODONE HCL 50 MG
1 TABLET ORAL
Qty: 14 | Refills: 0 | OUTPATIENT
Start: 2018-02-06 | End: 2018-02-19

## 2018-02-06 RX ORDER — SERTRALINE 25 MG/1
1 TABLET, FILM COATED ORAL
Qty: 14 | Refills: 0 | OUTPATIENT
Start: 2018-02-06 | End: 2018-02-19

## 2018-02-06 RX ORDER — LANOLIN ALCOHOL/MO/W.PET/CERES
1 CREAM (GRAM) TOPICAL
Qty: 14 | Refills: 0 | OUTPATIENT
Start: 2018-02-06 | End: 2018-02-19

## 2018-02-06 RX ADMIN — SERTRALINE 100 MILLIGRAM(S): 25 TABLET, FILM COATED ORAL at 09:27

## 2018-02-06 RX ADMIN — Medication 1 TABLET(S): at 09:27

## 2018-02-06 RX ADMIN — Medication 1 PATCH: at 09:27

## 2018-02-06 RX ADMIN — LAMOTRIGINE 25 MILLIGRAM(S): 25 TABLET, ORALLY DISINTEGRATING ORAL at 09:27

## 2018-02-06 RX ADMIN — Medication 2 MILLIGRAM(S): at 09:25

## 2018-02-06 RX ADMIN — Medication 1 MILLIGRAM(S): at 09:27

## 2018-02-20 RX ORDER — TRAZODONE HCL 50 MG
1 TABLET ORAL
Qty: 14 | Refills: 0 | OUTPATIENT
Start: 2018-02-20 | End: 2018-03-05

## 2018-02-20 RX ORDER — SERTRALINE 25 MG/1
1 TABLET, FILM COATED ORAL
Qty: 14 | Refills: 0 | OUTPATIENT
Start: 2018-02-20 | End: 2018-03-05

## 2018-02-20 RX ORDER — LANOLIN ALCOHOL/MO/W.PET/CERES
1 CREAM (GRAM) TOPICAL
Qty: 14 | Refills: 0 | OUTPATIENT
Start: 2018-02-20 | End: 2018-03-05

## 2018-02-20 RX ORDER — MIRTAZAPINE 45 MG/1
1 TABLET, ORALLY DISINTEGRATING ORAL
Qty: 14 | Refills: 0 | OUTPATIENT
Start: 2018-02-20 | End: 2018-03-05

## 2018-02-20 RX ORDER — LAMOTRIGINE 25 MG/1
1 TABLET, ORALLY DISINTEGRATING ORAL
Qty: 14 | Refills: 0 | OUTPATIENT
Start: 2018-02-20 | End: 2018-03-05

## 2018-03-05 RX ORDER — SERTRALINE 25 MG/1
1 TABLET, FILM COATED ORAL
Qty: 14 | Refills: 0 | OUTPATIENT
Start: 2018-03-05 | End: 2018-03-18

## 2018-03-05 RX ORDER — TRAZODONE HCL 50 MG
1 TABLET ORAL
Qty: 14 | Refills: 0 | OUTPATIENT
Start: 2018-03-05 | End: 2018-03-18

## 2018-03-05 RX ORDER — LANOLIN ALCOHOL/MO/W.PET/CERES
1 CREAM (GRAM) TOPICAL
Qty: 14 | Refills: 0
Start: 2018-03-05 | End: 2018-03-18

## 2018-03-05 RX ORDER — MIRTAZAPINE 45 MG/1
1 TABLET, ORALLY DISINTEGRATING ORAL
Qty: 14 | Refills: 0 | OUTPATIENT
Start: 2018-03-05 | End: 2018-03-18

## 2018-03-05 RX ORDER — LAMOTRIGINE 25 MG/1
1 TABLET, ORALLY DISINTEGRATING ORAL
Qty: 14 | Refills: 0 | OUTPATIENT
Start: 2018-03-05 | End: 2018-03-18

## 2018-04-04 ENCOUNTER — TRANSCRIPTION ENCOUNTER (OUTPATIENT)
Age: 33
End: 2018-04-04

## 2018-05-03 ENCOUNTER — TRANSCRIPTION ENCOUNTER (OUTPATIENT)
Age: 33
End: 2018-05-03

## 2018-05-05 ENCOUNTER — EMERGENCY (EMERGENCY)
Facility: HOSPITAL | Age: 33
LOS: 1 days | Discharge: HOME | End: 2018-05-05
Admitting: EMERGENCY MEDICINE
Payer: COMMERCIAL

## 2018-05-05 VITALS
TEMPERATURE: 99 F | DIASTOLIC BLOOD PRESSURE: 77 MMHG | OXYGEN SATURATION: 98 % | HEART RATE: 88 BPM | SYSTOLIC BLOOD PRESSURE: 107 MMHG | RESPIRATION RATE: 17 BRPM

## 2018-05-05 PROCEDURE — 99283 EMERGENCY DEPT VISIT LOW MDM: CPT

## 2018-05-05 RX ORDER — AMOXICILLIN 250 MG/5ML
1 SUSPENSION, RECONSTITUTED, ORAL (ML) ORAL
Qty: 21 | Refills: 0 | OUTPATIENT
Start: 2018-05-05 | End: 2018-05-11

## 2018-05-05 RX ORDER — AMOXICILLIN 250 MG/5ML
500 SUSPENSION, RECONSTITUTED, ORAL (ML) ORAL ONCE
Qty: 0 | Refills: 0 | Status: DISCONTINUED | OUTPATIENT
Start: 2018-05-05 | End: 2018-05-05

## 2018-05-05 RX ORDER — OXYCODONE AND ACETAMINOPHEN 5; 325 MG/1; MG/1
1 TABLET ORAL ONCE
Qty: 0 | Refills: 0 | Status: DISCONTINUED | OUTPATIENT
Start: 2018-05-05 | End: 2018-05-05

## 2018-05-05 RX ORDER — AMOXICILLIN 250 MG/5ML
500 SUSPENSION, RECONSTITUTED, ORAL (ML) ORAL ONCE
Qty: 0 | Refills: 0 | Status: COMPLETED | OUTPATIENT
Start: 2018-05-05 | End: 2018-05-05

## 2018-05-05 RX ORDER — OXYCODONE AND ACETAMINOPHEN 5; 325 MG/1; MG/1
1 TABLET ORAL EVERY 4 HOURS
Qty: 0 | Refills: 0 | Status: DISCONTINUED | OUTPATIENT
Start: 2018-05-05 | End: 2018-05-05

## 2018-05-05 RX ADMIN — Medication 500 MILLIGRAM(S): at 23:28

## 2018-05-05 RX ADMIN — OXYCODONE AND ACETAMINOPHEN 1 TABLET(S): 5; 325 TABLET ORAL at 22:54

## 2018-05-05 NOTE — ED PROVIDER NOTE - PLAN OF CARE
Take Amoxicillin 500mg three times a day for 7 days. Percocet 1 tablet every 6 hrs as needed for breakthrough discomfort- caution drowsiness while taking this medication- do not drive or operate heavy machinery. Take Ibuprofen 600mg every 6 hours as needed for mild-moderate pain. Follow up with your dentist or our dental clinic within 48 hours [phone] - call to make an appointment. Return to the Emergency Department for any new, worsening or concerning symptoms.

## 2018-05-05 NOTE — ED PROVIDER NOTE - PROGRESS NOTE DETAILS
MITCHELL Hsu: dental evaluated the patient - no drainable collection. Advised to f/u with dentist tomorrow. Medication given for pain control.

## 2018-05-05 NOTE — ED ADULT TRIAGE NOTE - CHIEF COMPLAINT QUOTE
pt. came in c/o swelling and pain on rt side of face started this morning, states "I think I have an abscess again on the rt side of my mouth." pt. denies any fever, no n/v nor dysphagia/REBEL. pt. speaks clearly and in full sentences. PMHx: Anxiety, Depression

## 2018-05-05 NOTE — PROGRESS NOTE ADULT - SUBJECTIVE AND OBJECTIVE BOX
CC: Pt presents complainting of pain in LR quad, tooth # 31.     HPI: Pain tooth # 31    Med HX: Depression    EOE: WNL   TMJ (WNL)  Lacerations (-)  Trismus (-)  LAD (-)  Swelling (-)  LOC (-)  Dysphagia (-)    IOE: MCT  Hard/Soft palate (WNL)  Tongue/Floor of Mouth (WNL)  Lacerations (-)  Labial Mucosa (WNL)  Buccal Mucosa (WNL)  Percussion (-)  Palpation (-)  Swelling (-)  Mobility (-)     Assessment: Pain tooth # 31 due to caries.     Treatment: Exam, no treatment indicated at this time due to no swelling.     Recommendations:   1) F/U with dentist for comprehensive care.  2) F/U with dentist if any swelling or signs of infection

## 2018-05-05 NOTE — ED PROVIDER NOTE - CARE PLAN
Principal Discharge DX:	Tooth pain  Assessment and plan of treatment:	Take Amoxicillin 500mg three times a day for 7 days. Percocet 1 tablet every 6 hrs as needed for breakthrough discomfort- caution drowsiness while taking this medication- do not drive or operate heavy machinery. Take Ibuprofen 600mg every 6 hours as needed for mild-moderate pain. Follow up with your dentist or our dental clinic within 48 hours [phone] - call to make an appointment. Return to the Emergency Department for any new, worsening or concerning symptoms.

## 2018-05-05 NOTE — ED PROVIDER NOTE - OBJECTIVE STATEMENT
32 year old male with a PMHx depression, anxiety, tobacco use pw right sided tooth pain (tooth number 31) that radiates to the right ear and facial swelling which began this morning. Pt has a hx of dental abscess and states that this feels similarly to past presentation. Last motrin 400mg 6-8 hours ago with mild relief. Denies n/v/f/c, CP, SOB, abdominal pain, dysuria, hematuria, melena, diarrhea. 32 year old male with a PMHx depression, anxiety, tobacco use pw right sided tooth pain (tooth number 31) that radiates to the right ear and facial swelling which began this morning. Pt has a hx of dental abscess and states that this feels similarly to past presentation. Pt last took motrin 400mg 6-8 hours ago with mild relief. Denies n/v/f/c, CP, SOB, abdominal pain, dysuria, hematuria, melena, diarrhea.

## 2018-05-05 NOTE — ED PROVIDER NOTE - MEDICAL DECISION MAKING DETAILS
32 year old male with a PMHx depression, anxiety, tobacco use pw right sided tooth pain (tooth number 31) that radiates to the right ear and facial swelling which began this morning  Plan: r/o dental abscess, pain management

## 2018-05-14 ENCOUNTER — TRANSCRIPTION ENCOUNTER (OUTPATIENT)
Age: 33
End: 2018-05-14

## 2018-05-21 ENCOUNTER — TRANSCRIPTION ENCOUNTER (OUTPATIENT)
Age: 33
End: 2018-05-21

## 2018-07-07 NOTE — ED PROVIDER NOTE - MOUTH NORMAL
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  On IV abx for UTI; +ILANA;
abnormal/expand...

## 2018-10-03 ENCOUNTER — EMERGENCY (EMERGENCY)
Facility: HOSPITAL | Age: 33
LOS: 1 days | Discharge: HOME | End: 2018-10-03
Attending: EMERGENCY MEDICINE
Payer: MEDICAID

## 2018-10-03 VITALS
SYSTOLIC BLOOD PRESSURE: 121 MMHG | OXYGEN SATURATION: 98 % | DIASTOLIC BLOOD PRESSURE: 82 MMHG | HEIGHT: 66 IN | TEMPERATURE: 99 F | RESPIRATION RATE: 16 BRPM | HEART RATE: 81 BPM | WEIGHT: 199.96 LBS

## 2018-10-03 PROCEDURE — 99283 EMERGENCY DEPT VISIT LOW MDM: CPT | Mod: 25

## 2018-10-03 PROCEDURE — 73130 X-RAY EXAM OF HAND: CPT | Mod: 26,RT

## 2018-10-03 PROCEDURE — 73130 X-RAY EXAM OF HAND: CPT

## 2018-10-03 PROCEDURE — 29125 APPL SHORT ARM SPLINT STATIC: CPT

## 2018-10-03 PROCEDURE — 29125 APPL SHORT ARM SPLINT STATIC: CPT | Mod: RT

## 2018-10-03 RX ORDER — OXYCODONE AND ACETAMINOPHEN 5; 325 MG/1; MG/1
1 TABLET ORAL ONCE
Qty: 0 | Refills: 0 | Status: DISCONTINUED | OUTPATIENT
Start: 2018-10-03 | End: 2018-10-03

## 2018-10-03 RX ORDER — OXYCODONE HYDROCHLORIDE 5 MG/1
1 TABLET ORAL
Qty: 12 | Refills: 0 | OUTPATIENT
Start: 2018-10-03 | End: 2018-10-05

## 2018-10-03 RX ADMIN — OXYCODONE AND ACETAMINOPHEN 1 TABLET(S): 5; 325 TABLET ORAL at 19:31

## 2018-10-03 NOTE — ED ADULT NURSE REASSESSMENT NOTE - NS ED NURSE REASSESS COMMENT FT1
pt reavaluated with scale pain 2 at present and soft cast applied to arm by MD and d/c home to follow up vital signs stable

## 2018-10-03 NOTE — ED ADULT NURSE NOTE - NSIMPLEMENTINTERV_GEN_ALL_ED
Implemented All Universal Safety Interventions:  [place] to call system. Call bell, personal items and telephone within reach. Instruct patient to call for assistance. Room bathroom lighting operational. Non-slip footwear when patient is off stretcher. Physically safe environment: no spills, clutter or unnecessary equipment. Stretcher in lowest position, wheels locked, appropriate side rails in place.

## 2018-10-03 NOTE — ED PROVIDER NOTE - PHYSICAL EXAMINATION
Gen: Alert, NAD  Head/eyes: NC/AT, PERRL, EOMI, normal lids/conjunctiva, no scleral icterus  ENT: Bilateral TM WNL, normal hearing, patent oropharynx without erythema/exudate, uvula midline, no peritonsillar abscess, no tongue/uvula swelling  Neck: supple, no tenderness/meningismus/JVD, Trachea midline  Pulm: Bilateral clear BS, normal resp effort, no wheeze/stridor/retractions  CV: RRR, no M/R/G, +2 dist pulses (radial, pedal DP/PT, popliteal)  Abd: soft, NT/ND, +BS, no guarding/rebound tenderness  Musculoskeletal: no edema/erythema/cyanosis, FROM in all extremities, no C/T/L spine ttp, +ttp right 2nd and 3rd MCP with swelling, no deformity noted  Skin: no rash, no vesicles, no petechaie, no ecchymosis  Neuro: AAOx3, CN 2-12 intact, normal sensation, 5/5 motor strength in all extremities, normal gait, no dysmetria

## 2018-10-03 NOTE — ED ADULT NURSE NOTE - OBJECTIVE STATEMENT
received pt with c/o rt hand pain s/ p 3 weeks ago 2nd and 3rd finger  redness and swelling  pt evaluated andmedicated with percocet 1 tab po and xray taken

## 2018-10-03 NOTE — ED PROVIDER NOTE - NS ED ROS FT

## 2018-10-03 NOTE — ED PROVIDER NOTE - OBJECTIVE STATEMENT
32 yo male s/p playing pool 3 weeks ago, was performing the break and right hand 2nd and 3rd MCP joint hit the edge of pool table (patient is left hand dominant) c/o persistent pain x 3 weeks at right 2nd and 3rd MCP, taking kaitlin aspirin without much relief.  Has been bethany taping his 2nd and 3rd digits.  No numbness/tingling.

## 2018-10-04 PROBLEM — F41.9 ANXIETY DISORDER, UNSPECIFIED: Chronic | Status: ACTIVE | Noted: 2018-05-05

## 2018-12-19 ENCOUNTER — INPATIENT (INPATIENT)
Facility: HOSPITAL | Age: 33
LOS: 7 days | Discharge: HOME | End: 2018-12-27
Attending: PSYCHIATRY & NEUROLOGY | Admitting: PSYCHIATRY & NEUROLOGY
Payer: MEDICAID

## 2018-12-19 ENCOUNTER — OUTPATIENT (OUTPATIENT)
Dept: OUTPATIENT SERVICES | Facility: HOSPITAL | Age: 33
LOS: 1 days | Discharge: STILL ADMITTED OR ADMINISTRATIVE | End: 2018-12-19
Payer: COMMERCIAL

## 2018-12-19 VITALS — TEMPERATURE: 98 F | WEIGHT: 210.1 LBS | HEIGHT: 66 IN | RESPIRATION RATE: 18 BRPM

## 2018-12-19 DIAGNOSIS — F32.9 MAJOR DEPRESSIVE DISORDER, SINGLE EPISODE, UNSPECIFIED: ICD-10-CM

## 2018-12-19 PROCEDURE — 90792 PSYCH DIAG EVAL W/MED SRVCS: CPT

## 2018-12-19 RX ORDER — FOLIC ACID 0.8 MG
1 TABLET ORAL DAILY
Qty: 0 | Refills: 0 | Status: DISCONTINUED | OUTPATIENT
Start: 2018-12-19 | End: 2018-12-20

## 2018-12-19 RX ORDER — HALOPERIDOL DECANOATE 100 MG/ML
5 INJECTION INTRAMUSCULAR ONCE
Qty: 0 | Refills: 0 | Status: DISCONTINUED | OUTPATIENT
Start: 2018-12-19 | End: 2018-12-27

## 2018-12-19 RX ORDER — NICOTINE POLACRILEX 2 MG
4 GUM BUCCAL
Qty: 0 | Refills: 0 | Status: DISCONTINUED | OUTPATIENT
Start: 2018-12-19 | End: 2018-12-27

## 2018-12-19 RX ORDER — DIPHENHYDRAMINE HCL 50 MG
50 CAPSULE ORAL ONCE
Qty: 0 | Refills: 0 | Status: DISCONTINUED | OUTPATIENT
Start: 2018-12-19 | End: 2018-12-27

## 2018-12-19 RX ORDER — DIPHENHYDRAMINE HCL 50 MG
50 CAPSULE ORAL EVERY 6 HOURS
Qty: 0 | Refills: 0 | Status: DISCONTINUED | OUTPATIENT
Start: 2018-12-19 | End: 2018-12-27

## 2018-12-19 RX ORDER — THIAMINE MONONITRATE (VIT B1) 100 MG
100 TABLET ORAL DAILY
Qty: 0 | Refills: 0 | Status: DISCONTINUED | OUTPATIENT
Start: 2018-12-19 | End: 2018-12-20

## 2018-12-19 RX ORDER — NICOTINE POLACRILEX 2 MG
1 GUM BUCCAL DAILY
Qty: 0 | Refills: 0 | Status: DISCONTINUED | OUTPATIENT
Start: 2018-12-19 | End: 2018-12-20

## 2018-12-19 RX ORDER — HALOPERIDOL DECANOATE 100 MG/ML
5 INJECTION INTRAMUSCULAR EVERY 6 HOURS
Qty: 0 | Refills: 0 | Status: DISCONTINUED | OUTPATIENT
Start: 2018-12-19 | End: 2018-12-27

## 2018-12-19 RX ADMIN — Medication 2 MILLIGRAM(S): at 21:32

## 2018-12-19 RX ADMIN — Medication 4 MILLIGRAM(S): at 22:09

## 2018-12-19 NOTE — CHART NOTE - NSCHARTNOTEFT_GEN_A_CORE
Screening Medical Evaluation  Patient Admitted from: [facility] admitting diagnosis: Major depressive disorder with single episode    PAST MEDICAL & SURGICAL HISTORY:  Anxiety and depression  No pertinent past medical history        Allergies    Quadrinal (Unknown)    Intolerances        Social History:     FAMILY HISTORY:      MEDICATIONS  (STANDING):    MEDICATIONS  (PRN):      Vital Signs Last 24 Hrs  T(C): --  T(F): --  HR: --  BP: --  BP(mean): --  RR: --  SpO2: --  CAPILLARY BLOOD GLUCOSE            PHYSICAL EXAM:  GENERAL: NAD, well-developed  HEAD:  Atraumatic, Normocephalic  EYES: EOMI, PERRLA, conjunctiva and sclera clear  NECK: Supple, No JVD  CHEST/LUNG: Clear to auscultation bilaterally; No wheeze  HEART: Regular rate and rhythm; No murmurs, rubs, or gallops  ABDOMEN: Soft, Nontender, Nondistended; Bowel sounds present  EXTREMITIES:  2+ Peripheral Pulses, No clubbing, cyanosis, or edema  PSYCH: AAOx3  NEUROLOGY: non-focal  SKIN:     LABS:                    RADIOLOGY & ADDITIONAL TESTS:    Assessment and Plan: 32 yo M with no significant PMH is admitted to [facility] with a primary psychiatric diagnosis of Major depressive disorder with single episode. The pt currently denies having any medical complaints such as chest pain, sob, abdominal pain, n/v/d/c, or any problems with urination or bowel movements. The rest of his screening physical is unremarkable.    1.Major depressive disorder with single episode-Plan: continue with meds as per primary psychiatric team Screening Medical Evaluation  Patient Admitted from: [facility] admitting diagnosis: Major depressive disorder with single episode    PAST MEDICAL & SURGICAL HISTORY:  Anxiety and depression  No pertinent past medical history        Allergies    Quadrinal (Unknown)    Intolerances        Social History:     FAMILY HISTORY:      MEDICATIONS  (STANDING):    MEDICATIONS  (PRN):      Vital Signs Last 24 Hrs  T(C): --  T(F): --  HR: --69  BP: --119/78  BP(mean): --  RR: --  SpO2: --  CAPILLARY BLOOD GLUCOSE            PHYSICAL EXAM:  GENERAL: NAD, well-developed  HEAD:  Atraumatic, Normocephalic  EYES: EOMI, PERRLA, conjunctiva and sclera clear  NECK: Supple, No JVD  CHEST/LUNG: Clear to auscultation bilaterally; No wheeze  HEART: Regular rate and rhythm; No murmurs, rubs, or gallops  ABDOMEN: Soft, Nontender, Nondistended; Bowel sounds present  EXTREMITIES:  2+ Peripheral Pulses, No clubbing, cyanosis, or edema  PSYCH: AAOx3  NEUROLOGY: non-focal  SKIN: self inflicted superficial cuts noted on R forearm using a knife    LABS:                    RADIOLOGY & ADDITIONAL TESTS:    Assessment and Plan: 32 yo M with PMH of alcohol abuse is admitted to [facility] with a primary psychiatric diagnosis of Major depressive disorder with single episode. Pt is noted to have mild hand tremors from alcohol withdrawal. The pt currently denies having any medical complaints such as chest pain, sob, abdominal pain, n/v/d/c, or any problems with urination or bowel movements. The rest of his screening physical is unremarkable.    1.Major depressive disorder with single episode-Plan: continue with meds as per primary psychiatric team  2.Alcohol abuse-Plan: continue with folic acid, multivitamin, and lorazepam

## 2018-12-20 LAB
ALBUMIN SERPL ELPH-MCNC: 4.9 G/DL — SIGNIFICANT CHANGE UP (ref 3.3–5)
ALP SERPL-CCNC: 63 U/L — SIGNIFICANT CHANGE UP (ref 40–120)
ALT FLD-CCNC: 24 U/L — SIGNIFICANT CHANGE UP (ref 4–41)
AST SERPL-CCNC: 25 U/L — SIGNIFICANT CHANGE UP (ref 4–40)
BASOPHILS # BLD AUTO: 0.04 K/UL — SIGNIFICANT CHANGE UP (ref 0–0.2)
BASOPHILS NFR BLD AUTO: 0.6 % — SIGNIFICANT CHANGE UP (ref 0–2)
BILIRUB SERPL-MCNC: 0.6 MG/DL — SIGNIFICANT CHANGE UP (ref 0.2–1.2)
BUN SERPL-MCNC: 11 MG/DL — SIGNIFICANT CHANGE UP (ref 7–23)
CALCIUM SERPL-MCNC: 9.6 MG/DL — SIGNIFICANT CHANGE UP (ref 8.4–10.5)
CHLORIDE SERPL-SCNC: 100 MMOL/L — SIGNIFICANT CHANGE UP (ref 98–107)
CHOLEST SERPL-MCNC: 239 MG/DL — HIGH (ref 120–199)
CO2 SERPL-SCNC: 23 MMOL/L — SIGNIFICANT CHANGE UP (ref 22–31)
CREAT SERPL-MCNC: 0.95 MG/DL — SIGNIFICANT CHANGE UP (ref 0.5–1.3)
EOSINOPHIL # BLD AUTO: 0.06 K/UL — SIGNIFICANT CHANGE UP (ref 0–0.5)
EOSINOPHIL NFR BLD AUTO: 0.8 % — SIGNIFICANT CHANGE UP (ref 0–6)
GLUCOSE SERPL-MCNC: 88 MG/DL — SIGNIFICANT CHANGE UP (ref 70–99)
HBA1C BLD-MCNC: 5.2 % — SIGNIFICANT CHANGE UP (ref 4–5.6)
HCT VFR BLD CALC: 47.9 % — SIGNIFICANT CHANGE UP (ref 39–50)
HDLC SERPL-MCNC: 60 MG/DL — HIGH (ref 35–55)
HGB BLD-MCNC: 16.2 G/DL — SIGNIFICANT CHANGE UP (ref 13–17)
IMM GRANULOCYTES # BLD AUTO: 0.03 # — SIGNIFICANT CHANGE UP
IMM GRANULOCYTES NFR BLD AUTO: 0.4 % — SIGNIFICANT CHANGE UP (ref 0–1.5)
LIPID PNL WITH DIRECT LDL SERPL: 181 MG/DL — SIGNIFICANT CHANGE UP
LYMPHOCYTES # BLD AUTO: 2.44 K/UL — SIGNIFICANT CHANGE UP (ref 1–3.3)
LYMPHOCYTES # BLD AUTO: 34 % — SIGNIFICANT CHANGE UP (ref 13–44)
MCHC RBC-ENTMCNC: 31.7 PG — SIGNIFICANT CHANGE UP (ref 27–34)
MCHC RBC-ENTMCNC: 33.8 % — SIGNIFICANT CHANGE UP (ref 32–36)
MCV RBC AUTO: 93.7 FL — SIGNIFICANT CHANGE UP (ref 80–100)
MONOCYTES # BLD AUTO: 0.79 K/UL — SIGNIFICANT CHANGE UP (ref 0–0.9)
MONOCYTES NFR BLD AUTO: 11 % — SIGNIFICANT CHANGE UP (ref 2–14)
NEUTROPHILS # BLD AUTO: 3.81 K/UL — SIGNIFICANT CHANGE UP (ref 1.8–7.4)
NEUTROPHILS NFR BLD AUTO: 53.2 % — SIGNIFICANT CHANGE UP (ref 43–77)
NRBC # FLD: 0 — SIGNIFICANT CHANGE UP
PLATELET # BLD AUTO: 332 K/UL — SIGNIFICANT CHANGE UP (ref 150–400)
PMV BLD: 9 FL — SIGNIFICANT CHANGE UP (ref 7–13)
POTASSIUM SERPL-MCNC: 4 MMOL/L — SIGNIFICANT CHANGE UP (ref 3.5–5.3)
POTASSIUM SERPL-SCNC: 4 MMOL/L — SIGNIFICANT CHANGE UP (ref 3.5–5.3)
PROT SERPL-MCNC: 7.5 G/DL — SIGNIFICANT CHANGE UP (ref 6–8.3)
RBC # BLD: 5.11 M/UL — SIGNIFICANT CHANGE UP (ref 4.2–5.8)
RBC # FLD: 12.4 % — SIGNIFICANT CHANGE UP (ref 10.3–14.5)
SODIUM SERPL-SCNC: 136 MMOL/L — SIGNIFICANT CHANGE UP (ref 135–145)
TRIGL SERPL-MCNC: 140 MG/DL — SIGNIFICANT CHANGE UP (ref 10–149)
TSH SERPL-MCNC: 2.31 UIU/ML — SIGNIFICANT CHANGE UP (ref 0.27–4.2)
WBC # BLD: 7.17 K/UL — SIGNIFICANT CHANGE UP (ref 3.8–10.5)
WBC # FLD AUTO: 7.17 K/UL — SIGNIFICANT CHANGE UP (ref 3.8–10.5)

## 2018-12-20 PROCEDURE — 99222 1ST HOSP IP/OBS MODERATE 55: CPT | Mod: GC

## 2018-12-20 RX ORDER — BUPROPION HYDROCHLORIDE 150 MG/1
150 TABLET, EXTENDED RELEASE ORAL DAILY
Qty: 0 | Refills: 0 | Status: DISCONTINUED | OUTPATIENT
Start: 2018-12-21 | End: 2018-12-24

## 2018-12-20 RX ORDER — FOLIC ACID 0.8 MG
1 TABLET ORAL DAILY
Qty: 0 | Refills: 0 | Status: DISCONTINUED | OUTPATIENT
Start: 2018-12-20 | End: 2018-12-27

## 2018-12-20 RX ORDER — NICOTINE POLACRILEX 2 MG
1 GUM BUCCAL DAILY
Qty: 0 | Refills: 0 | Status: DISCONTINUED | OUTPATIENT
Start: 2018-12-20 | End: 2018-12-27

## 2018-12-20 RX ORDER — THIAMINE MONONITRATE (VIT B1) 100 MG
100 TABLET ORAL DAILY
Qty: 0 | Refills: 0 | Status: DISCONTINUED | OUTPATIENT
Start: 2018-12-20 | End: 2018-12-27

## 2018-12-20 RX ADMIN — Medication 1.5 MILLIGRAM(S): at 22:44

## 2018-12-20 RX ADMIN — Medication 2 MILLIGRAM(S): at 06:38

## 2018-12-20 RX ADMIN — Medication 2 MILLIGRAM(S): at 09:35

## 2018-12-20 RX ADMIN — Medication 2 MILLIGRAM(S): at 02:01

## 2018-12-20 RX ADMIN — Medication 2 MILLIGRAM(S): at 14:22

## 2018-12-20 RX ADMIN — Medication 100 MILLIGRAM(S): at 09:35

## 2018-12-20 RX ADMIN — Medication 1 PATCH: at 20:01

## 2018-12-20 RX ADMIN — Medication 1 MILLIGRAM(S): at 09:35

## 2018-12-20 RX ADMIN — Medication 1 TABLET(S): at 09:35

## 2018-12-20 RX ADMIN — Medication 1 PATCH: at 09:35

## 2018-12-21 PROCEDURE — 90832 PSYTX W PT 30 MINUTES: CPT

## 2018-12-21 PROCEDURE — 99232 SBSQ HOSP IP/OBS MODERATE 35: CPT

## 2018-12-21 RX ORDER — LITHIUM CARBONATE 300 MG/1
600 TABLET, EXTENDED RELEASE ORAL ONCE
Qty: 0 | Refills: 0 | Status: COMPLETED | OUTPATIENT
Start: 2018-12-21 | End: 2018-12-21

## 2018-12-21 RX ORDER — TRAZODONE HCL 50 MG
100 TABLET ORAL AT BEDTIME
Qty: 0 | Refills: 0 | Status: DISCONTINUED | OUTPATIENT
Start: 2018-12-21 | End: 2018-12-22

## 2018-12-21 RX ADMIN — Medication 100 MILLIGRAM(S): at 09:28

## 2018-12-21 RX ADMIN — Medication 4 MILLIGRAM(S): at 14:33

## 2018-12-21 RX ADMIN — Medication 4 MILLIGRAM(S): at 17:22

## 2018-12-21 RX ADMIN — Medication 50 MILLIGRAM(S): at 00:31

## 2018-12-21 RX ADMIN — Medication 1 PATCH: at 09:26

## 2018-12-21 RX ADMIN — Medication 1.5 MILLIGRAM(S): at 06:27

## 2018-12-21 RX ADMIN — Medication 100 MILLIGRAM(S): at 21:43

## 2018-12-21 RX ADMIN — Medication 1.5 MILLIGRAM(S): at 18:50

## 2018-12-21 RX ADMIN — LITHIUM CARBONATE 600 MILLIGRAM(S): 300 TABLET, EXTENDED RELEASE ORAL at 10:25

## 2018-12-21 RX ADMIN — Medication 1 MILLIGRAM(S): at 22:23

## 2018-12-21 RX ADMIN — Medication 1 PATCH: at 19:37

## 2018-12-21 RX ADMIN — Medication 1.5 MILLIGRAM(S): at 14:42

## 2018-12-21 RX ADMIN — Medication 1 TABLET(S): at 09:25

## 2018-12-21 RX ADMIN — Medication 1 MILLIGRAM(S): at 09:25

## 2018-12-21 RX ADMIN — Medication 4 MILLIGRAM(S): at 23:24

## 2018-12-21 RX ADMIN — Medication 1.5 MILLIGRAM(S): at 10:25

## 2018-12-21 RX ADMIN — Medication 1.5 MILLIGRAM(S): at 03:34

## 2018-12-21 RX ADMIN — BUPROPION HYDROCHLORIDE 150 MILLIGRAM(S): 150 TABLET, EXTENDED RELEASE ORAL at 09:25

## 2018-12-22 LAB — LITHIUM SERPL-MCNC: 0.09 MMOL/L — LOW (ref 0.6–1.2)

## 2018-12-22 PROCEDURE — 99231 SBSQ HOSP IP/OBS SF/LOW 25: CPT

## 2018-12-22 RX ORDER — LITHIUM CARBONATE 300 MG/1
900 TABLET, EXTENDED RELEASE ORAL AT BEDTIME
Qty: 0 | Refills: 0 | Status: DISCONTINUED | OUTPATIENT
Start: 2018-12-22 | End: 2018-12-27

## 2018-12-22 RX ORDER — TRAZODONE HCL 50 MG
150 TABLET ORAL AT BEDTIME
Qty: 0 | Refills: 0 | Status: DISCONTINUED | OUTPATIENT
Start: 2018-12-22 | End: 2018-12-27

## 2018-12-22 RX ADMIN — Medication 4 MILLIGRAM(S): at 19:39

## 2018-12-22 RX ADMIN — Medication 150 MILLIGRAM(S): at 21:39

## 2018-12-22 RX ADMIN — Medication 100 MILLIGRAM(S): at 09:59

## 2018-12-22 RX ADMIN — Medication 1 MILLIGRAM(S): at 10:30

## 2018-12-22 RX ADMIN — Medication 1 MILLIGRAM(S): at 14:03

## 2018-12-22 RX ADMIN — LITHIUM CARBONATE 900 MILLIGRAM(S): 300 TABLET, EXTENDED RELEASE ORAL at 21:38

## 2018-12-22 RX ADMIN — HALOPERIDOL DECANOATE 5 MILLIGRAM(S): 100 INJECTION INTRAMUSCULAR at 21:39

## 2018-12-22 RX ADMIN — Medication 1 TABLET(S): at 09:58

## 2018-12-22 RX ADMIN — Medication 50 MILLIGRAM(S): at 21:39

## 2018-12-22 RX ADMIN — Medication 1 PATCH: at 09:59

## 2018-12-22 RX ADMIN — Medication 1 MILLIGRAM(S): at 06:54

## 2018-12-22 RX ADMIN — Medication 4 MILLIGRAM(S): at 17:26

## 2018-12-22 RX ADMIN — Medication 1 MILLIGRAM(S): at 09:55

## 2018-12-22 RX ADMIN — Medication 1 MILLIGRAM(S): at 02:09

## 2018-12-22 RX ADMIN — Medication 0.5 MILLIGRAM(S): at 21:38

## 2018-12-22 RX ADMIN — Medication 1 PATCH: at 21:39

## 2018-12-22 RX ADMIN — BUPROPION HYDROCHLORIDE 150 MILLIGRAM(S): 150 TABLET, EXTENDED RELEASE ORAL at 09:55

## 2018-12-22 RX ADMIN — Medication 1 MILLIGRAM(S): at 18:41

## 2018-12-23 PROCEDURE — 99231 SBSQ HOSP IP/OBS SF/LOW 25: CPT

## 2018-12-23 RX ADMIN — Medication 1 PATCH: at 09:29

## 2018-12-23 RX ADMIN — Medication 0.5 MILLIGRAM(S): at 16:10

## 2018-12-23 RX ADMIN — LITHIUM CARBONATE 900 MILLIGRAM(S): 300 TABLET, EXTENDED RELEASE ORAL at 21:12

## 2018-12-23 RX ADMIN — Medication 1 PATCH: at 18:58

## 2018-12-23 RX ADMIN — Medication 4 MILLIGRAM(S): at 16:15

## 2018-12-23 RX ADMIN — Medication 50 MILLIGRAM(S): at 22:49

## 2018-12-23 RX ADMIN — Medication 0.5 MILLIGRAM(S): at 11:29

## 2018-12-23 RX ADMIN — Medication 1 TABLET(S): at 09:24

## 2018-12-23 RX ADMIN — Medication 4 MILLIGRAM(S): at 13:13

## 2018-12-23 RX ADMIN — Medication 100 MILLIGRAM(S): at 09:29

## 2018-12-23 RX ADMIN — Medication 0.5 MILLIGRAM(S): at 18:48

## 2018-12-23 RX ADMIN — Medication 150 MILLIGRAM(S): at 21:12

## 2018-12-23 RX ADMIN — Medication 1 MILLIGRAM(S): at 09:24

## 2018-12-23 RX ADMIN — Medication 0.5 MILLIGRAM(S): at 06:11

## 2018-12-23 RX ADMIN — BUPROPION HYDROCHLORIDE 150 MILLIGRAM(S): 150 TABLET, EXTENDED RELEASE ORAL at 09:24

## 2018-12-24 PROCEDURE — 99232 SBSQ HOSP IP/OBS MODERATE 35: CPT

## 2018-12-24 RX ORDER — BUPROPION HYDROCHLORIDE 150 MG/1
300 TABLET, EXTENDED RELEASE ORAL DAILY
Qty: 0 | Refills: 0 | Status: DISCONTINUED | OUTPATIENT
Start: 2018-12-25 | End: 2018-12-27

## 2018-12-24 RX ADMIN — Medication 150 MILLIGRAM(S): at 21:19

## 2018-12-24 RX ADMIN — Medication 1 PATCH: at 09:14

## 2018-12-24 RX ADMIN — Medication 1 PATCH: at 21:19

## 2018-12-24 RX ADMIN — Medication 50 MILLIGRAM(S): at 22:19

## 2018-12-24 RX ADMIN — Medication 100 MILLIGRAM(S): at 09:15

## 2018-12-24 RX ADMIN — LITHIUM CARBONATE 900 MILLIGRAM(S): 300 TABLET, EXTENDED RELEASE ORAL at 21:19

## 2018-12-24 RX ADMIN — Medication 1 PATCH: at 09:15

## 2018-12-24 RX ADMIN — Medication 1 MILLIGRAM(S): at 09:14

## 2018-12-24 RX ADMIN — HALOPERIDOL DECANOATE 5 MILLIGRAM(S): 100 INJECTION INTRAMUSCULAR at 22:19

## 2018-12-24 RX ADMIN — Medication 1 TABLET(S): at 09:14

## 2018-12-24 RX ADMIN — BUPROPION HYDROCHLORIDE 150 MILLIGRAM(S): 150 TABLET, EXTENDED RELEASE ORAL at 09:14

## 2018-12-25 RX ADMIN — LITHIUM CARBONATE 900 MILLIGRAM(S): 300 TABLET, EXTENDED RELEASE ORAL at 21:50

## 2018-12-25 RX ADMIN — Medication 50 MILLIGRAM(S): at 21:53

## 2018-12-25 RX ADMIN — HALOPERIDOL DECANOATE 5 MILLIGRAM(S): 100 INJECTION INTRAMUSCULAR at 21:54

## 2018-12-25 RX ADMIN — Medication 100 MILLIGRAM(S): at 09:28

## 2018-12-25 RX ADMIN — Medication 1 PATCH: at 21:50

## 2018-12-25 RX ADMIN — Medication 1 MILLIGRAM(S): at 09:27

## 2018-12-25 RX ADMIN — Medication 4 MILLIGRAM(S): at 09:16

## 2018-12-25 RX ADMIN — Medication 1 TABLET(S): at 09:27

## 2018-12-25 RX ADMIN — BUPROPION HYDROCHLORIDE 300 MILLIGRAM(S): 150 TABLET, EXTENDED RELEASE ORAL at 09:27

## 2018-12-25 RX ADMIN — Medication 150 MILLIGRAM(S): at 21:50

## 2018-12-25 RX ADMIN — Medication 4 MILLIGRAM(S): at 13:17

## 2018-12-25 RX ADMIN — Medication 1 PATCH: at 09:28

## 2018-12-26 LAB
BUN SERPL-MCNC: 13 MG/DL — SIGNIFICANT CHANGE UP (ref 7–23)
CALCIUM SERPL-MCNC: 10.3 MG/DL — SIGNIFICANT CHANGE UP (ref 8.4–10.5)
CHLORIDE SERPL-SCNC: 98 MMOL/L — SIGNIFICANT CHANGE UP (ref 98–107)
CO2 SERPL-SCNC: 27 MMOL/L — SIGNIFICANT CHANGE UP (ref 22–31)
CREAT SERPL-MCNC: 0.99 MG/DL — SIGNIFICANT CHANGE UP (ref 0.5–1.3)
GLUCOSE SERPL-MCNC: 98 MG/DL — SIGNIFICANT CHANGE UP (ref 70–99)
LITHIUM SERPL-MCNC: 0.53 MMOL/L — LOW (ref 0.6–1.2)
POTASSIUM SERPL-MCNC: 4.2 MMOL/L — SIGNIFICANT CHANGE UP (ref 3.5–5.3)
POTASSIUM SERPL-SCNC: 4.2 MMOL/L — SIGNIFICANT CHANGE UP (ref 3.5–5.3)
SODIUM SERPL-SCNC: 137 MMOL/L — SIGNIFICANT CHANGE UP (ref 135–145)

## 2018-12-26 PROCEDURE — 90832 PSYTX W PT 30 MINUTES: CPT

## 2018-12-26 PROCEDURE — 99232 SBSQ HOSP IP/OBS MODERATE 35: CPT

## 2018-12-26 RX ORDER — TRAZODONE HCL 50 MG
1 TABLET ORAL
Qty: 14 | Refills: 0 | OUTPATIENT
Start: 2018-12-26 | End: 2019-01-08

## 2018-12-26 RX ORDER — ACETAMINOPHEN 500 MG
650 TABLET ORAL EVERY 8 HOURS
Qty: 0 | Refills: 0 | Status: DISCONTINUED | OUTPATIENT
Start: 2018-12-26 | End: 2018-12-27

## 2018-12-26 RX ORDER — ACETAMINOPHEN 500 MG
650 TABLET ORAL ONCE
Qty: 0 | Refills: 0 | Status: COMPLETED | OUTPATIENT
Start: 2018-12-26 | End: 2018-12-26

## 2018-12-26 RX ORDER — LITHIUM CARBONATE 300 MG/1
2 TABLET, EXTENDED RELEASE ORAL
Qty: 28 | Refills: 0 | OUTPATIENT
Start: 2018-12-26 | End: 2019-01-08

## 2018-12-26 RX ORDER — BUPROPION HYDROCHLORIDE 150 MG/1
1 TABLET, EXTENDED RELEASE ORAL
Qty: 14 | Refills: 0 | OUTPATIENT
Start: 2018-12-26 | End: 2019-01-08

## 2018-12-26 RX ADMIN — Medication 650 MILLIGRAM(S): at 05:44

## 2018-12-26 RX ADMIN — Medication 650 MILLIGRAM(S): at 18:04

## 2018-12-26 RX ADMIN — Medication 1 MILLIGRAM(S): at 09:58

## 2018-12-26 RX ADMIN — HALOPERIDOL DECANOATE 5 MILLIGRAM(S): 100 INJECTION INTRAMUSCULAR at 22:34

## 2018-12-26 RX ADMIN — Medication 150 MILLIGRAM(S): at 21:10

## 2018-12-26 RX ADMIN — Medication 1 PATCH: at 09:57

## 2018-12-26 RX ADMIN — Medication 50 MILLIGRAM(S): at 22:34

## 2018-12-26 RX ADMIN — Medication 650 MILLIGRAM(S): at 04:44

## 2018-12-26 RX ADMIN — LITHIUM CARBONATE 900 MILLIGRAM(S): 300 TABLET, EXTENDED RELEASE ORAL at 21:10

## 2018-12-26 RX ADMIN — Medication 650 MILLIGRAM(S): at 11:24

## 2018-12-26 RX ADMIN — Medication 100 MILLIGRAM(S): at 09:56

## 2018-12-26 RX ADMIN — Medication 650 MILLIGRAM(S): at 12:04

## 2018-12-26 RX ADMIN — BUPROPION HYDROCHLORIDE 300 MILLIGRAM(S): 150 TABLET, EXTENDED RELEASE ORAL at 09:58

## 2018-12-26 RX ADMIN — Medication 650 MILLIGRAM(S): at 18:21

## 2018-12-26 RX ADMIN — Medication 1 TABLET(S): at 09:58

## 2018-12-27 VITALS — TEMPERATURE: 98 F

## 2018-12-27 DIAGNOSIS — F32.9 MAJOR DEPRESSIVE DISORDER, SINGLE EPISODE, UNSPECIFIED: ICD-10-CM

## 2018-12-27 PROCEDURE — 99238 HOSP IP/OBS DSCHRG MGMT 30/<: CPT

## 2018-12-27 RX ADMIN — Medication 1 MILLIGRAM(S): at 09:07

## 2018-12-27 RX ADMIN — Medication 1 PATCH: at 09:08

## 2018-12-27 RX ADMIN — Medication 100 MILLIGRAM(S): at 09:07

## 2018-12-27 RX ADMIN — BUPROPION HYDROCHLORIDE 300 MILLIGRAM(S): 150 TABLET, EXTENDED RELEASE ORAL at 09:07

## 2018-12-27 RX ADMIN — Medication 650 MILLIGRAM(S): at 11:05

## 2018-12-27 RX ADMIN — Medication 650 MILLIGRAM(S): at 09:35

## 2019-01-08 RX ORDER — BUPROPION HYDROCHLORIDE 150 MG/1
1 TABLET, EXTENDED RELEASE ORAL
Qty: 14 | Refills: 0
Start: 2019-01-08 | End: 2019-01-21

## 2019-01-08 RX ORDER — LITHIUM CARBONATE 300 MG/1
2 TABLET, EXTENDED RELEASE ORAL
Qty: 28 | Refills: 0
Start: 2019-01-08 | End: 2019-01-21

## 2019-01-08 RX ORDER — TRAZODONE HCL 50 MG
1 TABLET ORAL
Qty: 14 | Refills: 0
Start: 2019-01-08 | End: 2019-01-21

## 2019-05-28 ENCOUNTER — TRANSCRIPTION ENCOUNTER (OUTPATIENT)
Age: 34
End: 2019-05-28

## 2019-06-15 ENCOUNTER — TRANSCRIPTION ENCOUNTER (OUTPATIENT)
Age: 34
End: 2019-06-15

## 2019-08-08 ENCOUNTER — TRANSCRIPTION ENCOUNTER (OUTPATIENT)
Age: 34
End: 2019-08-08

## 2019-09-11 ENCOUNTER — EMERGENCY (EMERGENCY)
Facility: HOSPITAL | Age: 34
LOS: 1 days | Discharge: HOME | End: 2019-09-11
Attending: EMERGENCY MEDICINE | Admitting: EMERGENCY MEDICINE
Payer: MEDICAID

## 2019-09-11 VITALS
DIASTOLIC BLOOD PRESSURE: 70 MMHG | SYSTOLIC BLOOD PRESSURE: 108 MMHG | HEART RATE: 82 BPM | TEMPERATURE: 99 F | WEIGHT: 199.96 LBS | OXYGEN SATURATION: 98 % | RESPIRATION RATE: 14 BRPM

## 2019-09-11 VITALS
DIASTOLIC BLOOD PRESSURE: 73 MMHG | TEMPERATURE: 99 F | RESPIRATION RATE: 14 BRPM | OXYGEN SATURATION: 96 % | SYSTOLIC BLOOD PRESSURE: 113 MMHG | HEART RATE: 72 BPM

## 2019-09-11 LAB
ALBUMIN SERPL ELPH-MCNC: 4 G/DL — SIGNIFICANT CHANGE UP (ref 3.3–5)
ALP SERPL-CCNC: 60 U/L — SIGNIFICANT CHANGE UP (ref 40–120)
ALT FLD-CCNC: 18 U/L — SIGNIFICANT CHANGE UP (ref 12–78)
ANION GAP SERPL CALC-SCNC: 8 MMOL/L — SIGNIFICANT CHANGE UP (ref 5–17)
AST SERPL-CCNC: 14 U/L — LOW (ref 15–37)
BASOPHILS # BLD AUTO: 0.05 K/UL — SIGNIFICANT CHANGE UP (ref 0–0.2)
BASOPHILS NFR BLD AUTO: 0.5 % — SIGNIFICANT CHANGE UP (ref 0–2)
BILIRUB SERPL-MCNC: 0.2 MG/DL — SIGNIFICANT CHANGE UP (ref 0.2–1.2)
BUN SERPL-MCNC: 9 MG/DL — SIGNIFICANT CHANGE UP (ref 7–23)
CALCIUM SERPL-MCNC: 8.9 MG/DL — SIGNIFICANT CHANGE UP (ref 8.5–10.1)
CHLORIDE SERPL-SCNC: 108 MMOL/L — SIGNIFICANT CHANGE UP (ref 96–108)
CK SERPL-CCNC: 70 U/L — SIGNIFICANT CHANGE UP (ref 26–308)
CO2 SERPL-SCNC: 26 MMOL/L — SIGNIFICANT CHANGE UP (ref 22–31)
CREAT SERPL-MCNC: 1 MG/DL — SIGNIFICANT CHANGE UP (ref 0.5–1.3)
EOSINOPHIL # BLD AUTO: 0.14 K/UL — SIGNIFICANT CHANGE UP (ref 0–0.5)
EOSINOPHIL NFR BLD AUTO: 1.5 % — SIGNIFICANT CHANGE UP (ref 0–6)
GLUCOSE SERPL-MCNC: 89 MG/DL — SIGNIFICANT CHANGE UP (ref 70–99)
HCT VFR BLD CALC: 40.8 % — SIGNIFICANT CHANGE UP (ref 39–50)
HGB BLD-MCNC: 13.5 G/DL — SIGNIFICANT CHANGE UP (ref 13–17)
IMM GRANULOCYTES NFR BLD AUTO: 0.6 % — SIGNIFICANT CHANGE UP (ref 0–1.5)
LITHIUM SERPL-MCNC: 0.5 MMOL/L — LOW (ref 0.6–1.2)
LYMPHOCYTES # BLD AUTO: 2.47 K/UL — SIGNIFICANT CHANGE UP (ref 1–3.3)
LYMPHOCYTES # BLD AUTO: 26.5 % — SIGNIFICANT CHANGE UP (ref 13–44)
MCHC RBC-ENTMCNC: 30.4 PG — SIGNIFICANT CHANGE UP (ref 27–34)
MCHC RBC-ENTMCNC: 33.1 GM/DL — SIGNIFICANT CHANGE UP (ref 32–36)
MCV RBC AUTO: 91.9 FL — SIGNIFICANT CHANGE UP (ref 80–100)
MONOCYTES # BLD AUTO: 0.83 K/UL — SIGNIFICANT CHANGE UP (ref 0–0.9)
MONOCYTES NFR BLD AUTO: 8.9 % — SIGNIFICANT CHANGE UP (ref 2–14)
NEUTROPHILS # BLD AUTO: 5.76 K/UL — SIGNIFICANT CHANGE UP (ref 1.8–7.4)
NEUTROPHILS NFR BLD AUTO: 62 % — SIGNIFICANT CHANGE UP (ref 43–77)
NRBC # BLD: 0 /100 WBCS — SIGNIFICANT CHANGE UP (ref 0–0)
PLATELET # BLD AUTO: 334 K/UL — SIGNIFICANT CHANGE UP (ref 150–400)
POTASSIUM SERPL-MCNC: 4.2 MMOL/L — SIGNIFICANT CHANGE UP (ref 3.5–5.3)
POTASSIUM SERPL-SCNC: 4.2 MMOL/L — SIGNIFICANT CHANGE UP (ref 3.5–5.3)
PROT SERPL-MCNC: 6.8 G/DL — SIGNIFICANT CHANGE UP (ref 6–8.3)
RBC # BLD: 4.44 M/UL — SIGNIFICANT CHANGE UP (ref 4.2–5.8)
RBC # FLD: 12.7 % — SIGNIFICANT CHANGE UP (ref 10.3–14.5)
SODIUM SERPL-SCNC: 142 MMOL/L — SIGNIFICANT CHANGE UP (ref 135–145)
TROPONIN I SERPL-MCNC: <.015 NG/ML — SIGNIFICANT CHANGE UP (ref 0.01–0.04)
WBC # BLD: 9.31 K/UL — SIGNIFICANT CHANGE UP (ref 3.8–10.5)
WBC # FLD AUTO: 9.31 K/UL — SIGNIFICANT CHANGE UP (ref 3.8–10.5)

## 2019-09-11 PROCEDURE — 71046 X-RAY EXAM CHEST 2 VIEWS: CPT | Mod: 26

## 2019-09-11 PROCEDURE — 93010 ELECTROCARDIOGRAM REPORT: CPT

## 2019-09-11 PROCEDURE — 82550 ASSAY OF CK (CPK): CPT

## 2019-09-11 PROCEDURE — 99284 EMERGENCY DEPT VISIT MOD MDM: CPT

## 2019-09-11 PROCEDURE — 71046 X-RAY EXAM CHEST 2 VIEWS: CPT

## 2019-09-11 PROCEDURE — 84484 ASSAY OF TROPONIN QUANT: CPT

## 2019-09-11 PROCEDURE — 80178 ASSAY OF LITHIUM: CPT

## 2019-09-11 PROCEDURE — 93005 ELECTROCARDIOGRAM TRACING: CPT

## 2019-09-11 PROCEDURE — 80053 COMPREHEN METABOLIC PANEL: CPT

## 2019-09-11 PROCEDURE — 99284 EMERGENCY DEPT VISIT MOD MDM: CPT | Mod: 25

## 2019-09-11 PROCEDURE — 85027 COMPLETE CBC AUTOMATED: CPT

## 2019-09-11 PROCEDURE — 36415 COLL VENOUS BLD VENIPUNCTURE: CPT

## 2019-09-11 RX ORDER — ESCITALOPRAM OXALATE 10 MG/1
1 TABLET, FILM COATED ORAL
Qty: 0 | Refills: 0 | DISCHARGE

## 2019-09-11 RX ORDER — BUPROPION HYDROCHLORIDE 150 MG/1
1 TABLET, EXTENDED RELEASE ORAL
Qty: 0 | Refills: 0 | DISCHARGE

## 2019-09-11 RX ORDER — ASPIRIN/CALCIUM CARB/MAGNESIUM 324 MG
325 TABLET ORAL ONCE
Refills: 0 | Status: COMPLETED | OUTPATIENT
Start: 2019-09-11 | End: 2019-09-11

## 2019-09-11 NOTE — ED PROVIDER NOTE - OBJECTIVE STATEMENT
33yo male who presents with chest pain for 3 days. pt c/o intermittent stabbing chest pain, midsternal non radiating lasting for several seconds, no sob, no dizziness, no diaphoresis, pt was seen at urgent care yesterday and told to go to the ER if the pain gets worse and the symptoms have wosrened

## 2019-09-11 NOTE — ED ADULT NURSE NOTE - CHPI ED NUR SYMPTOMS NEG
no chills/no vomiting/no congestion/no back pain/no shortness of breath/no fever/no nausea/no syncope/no diaphoresis

## 2019-09-11 NOTE — ED PROVIDER NOTE - CPE EDP GASTRO NORM
Patient pacing in room stating that it is helping relieve his flank pain.
Patient states he has satisfactory relief of pain and nausea. PIV removed. Discharge instructions reviewed by provider. Patient discharged ambulatory.
normal...

## 2019-09-11 NOTE — ED ADULT NURSE NOTE - OBJECTIVE STATEMENT
Pt A&Ox4, ambulatory to ED c/o chest pain.  Pt states that on Monday he began having intermittent episodes of "stabbing" pain to sternal chest area, no radiation.  Pt also felt slightly dizzy on Monday.  Pt denies N/V, shortness of breath.  Pt states episodes last a few seconds, slowly fade, then go away for a little bit, only to return.  Pt was seen at urgent care, had EKG, and sent to ED for eval.

## 2019-09-11 NOTE — ED PROVIDER NOTE - PATIENT PORTAL LINK FT
You can access the FollowMyHealth Patient Portal offered by [facility] by registering at the following website: [email or web address]. By joining [employer]’s FollowMyHealth portal, you will also be able to view your health information using other applications (apps) compatible with our system.

## 2019-09-12 ENCOUNTER — TRANSCRIPTION ENCOUNTER (OUTPATIENT)
Age: 34
End: 2019-09-12

## 2020-05-18 ENCOUNTER — TRANSCRIPTION ENCOUNTER (OUTPATIENT)
Age: 35
End: 2020-05-18

## 2020-08-05 NOTE — ED ADULT NURSE NOTE - NS ED NURSE RECORD ANOTHER HT AND WT
Activity as tolerated, go slow and take rest periods if needed, till you are back to your previous activity level   Yes

## 2020-09-04 ENCOUNTER — TRANSCRIPTION ENCOUNTER (OUTPATIENT)
Age: 35
End: 2020-09-04

## 2020-09-11 ENCOUNTER — TRANSCRIPTION ENCOUNTER (OUTPATIENT)
Age: 35
End: 2020-09-11

## 2020-10-23 NOTE — ED PROVIDER NOTE - QRS
----- Message from Lore Martinez sent at 10/22/2020  1:53 PM CDT -----  Contact: karmen Cory 's office/corie/[phone]  Investigate called in regards to letting the office know that the pt has passed away (10-22-20 45 min ago) and wanted to know if the dr would Sign the death certificate. Investigate would like a call back,     Please advise    
Called back and they will send to JENNIFER  
normal

## 2021-04-03 NOTE — ED PROVIDER NOTE - PSH
Pt arrives via  ambulance and police. Pt states he was watching tv, drinking coffee and then he woke up on the floor. Pt states he has no idea what happened. Pt has left side face, back of head, left wrist pain.   No significant past surgical history

## 2021-04-22 ENCOUNTER — TRANSCRIPTION ENCOUNTER (OUTPATIENT)
Age: 36
End: 2021-04-22

## 2021-07-21 ENCOUNTER — TRANSCRIPTION ENCOUNTER (OUTPATIENT)
Age: 36
End: 2021-07-21

## 2021-08-12 NOTE — ED ADULT NURSE NOTE - TIMING
SNF Follow-up    Questions/Answers      Responses   Acute Facility Discharged From  [place]   Acute Discharge Date  08/10/21   Name of the Skilled Nursing Facility?  [facility]   Tier Level of the Skilled Nursing Facility  2   Purpose of SNF Admission  PT, OT   Estimated length of stay for the patient?  LTC   Who is the insurance provider or payor of patient stay?  Private Pay   Progression of Patient?  ACM spoke with Camille at [facility],  pt is private pay but is staying for LTC.         gradual onset

## 2022-09-03 ENCOUNTER — NON-APPOINTMENT (OUTPATIENT)
Age: 37
End: 2022-09-03

## 2022-09-12 ENCOUNTER — NON-APPOINTMENT (OUTPATIENT)
Age: 37
End: 2022-09-12

## 2022-09-29 ENCOUNTER — APPOINTMENT (OUTPATIENT)
Dept: ORTHOPEDIC SURGERY | Facility: CLINIC | Age: 37
End: 2022-09-29

## 2023-08-27 NOTE — ED ADULT NURSE NOTE - CAS DISCH BELONGINGS RETURNED
[facility]     Addiction Progress Note - Addiction Service        Date of Admission:  8/21/2023  Assessment & Plan       Mr Morel is a 44 yo gentleman with decompensated alcoholhic cirrhosis admitted for worsening alcoholic hepatitis and severe acute kidney injury with hematochezia overnight but stable vitals and hgb     # Alcohol Use Disorder, severe  # Decompensated alcoholic cirrhosis  # Alcoholic hepatitis, severe  # Hx of variceal hemmorhage  # Acute kidney injury, improving  Patient reports last drink was on Saturday 8/19/23. He has no current symptoms of withdrawal. Prev episodes of WD seizure x 1. He has expressed interest in treatment and was planning for chalo but became to medically complex. He was planing for treatment at [facility] but was sent to ED due to severe jaundice. His severe JOCELYN appears to be improving with good UOP and improving renal function daily for 2 days now.  Hepatology has recommended against an expedited eval for liver transplantation. He is certainly a good candidate for medication assisted treatment for alcohol use disorder. While we Will sometimes consider starting naltrexone in some patients with severe hepatic dysfunction, his combined severe JOCELYN preclude this presently (even more build-up of metabolites can occur with higher risk of adverse effects). Acamprosate requires GFR of > 30. Dicsussed gabapentin with patient. We can dose adjust for his renal function and target dose would be 300 mg daily. I would recommend starting at bedtime today but at lower doses  - MAR indicates pt refused gabapentin however, per his understanding, it was never offered.   - Reviewed recommendation again and suggested first dose this AM  - Work-up for hematochezia may be for colonscopy in AM, pt was quite frustrated at the time of my exam and mad about thie recommendation. Advised reviewing [facility] prim team and GI.      # Methamphetamine use disorder,  "severe, in sustained remission  He was inovled with chem dep treatment for meth use. Was quite a heavy user (smoking) for a number of years but without use since 2003 after successful treatment         # Peer Support:   -Our peer  will meet the patient if agreeable and still hospitalized on Thursday, to provide additional outpatient resources  -To contact Danielle Peer  from [facility]): call or text: [phone]    # Addiction Social Worker:   Our addiction social worker Ben Ramiro can be contacted on her pager [phone] or texted/called at [phone]    The patient's care was discussed with the Patient.    Time Spent on this Encounter   I spent more than 35 minutes on the unit/floor managing the care of Elroy SEYMOUR Morel Sr.. Over 50% of my time was spent on the following:   - Counseling the patient and/or family regarding: diagnostic results and risks and benefits of treatment options  - Coordination of care with the: coordination of care not performed today    Nba Patel MD   Addiction Service   [facility]     Contact information available via [facility] Paging/Directory under \"addiction medicine\"        ______________________________________________________________________    Interval History   Frustrated and hungry at time of my interview. There was hematochezia overnight and he was made NPO. Not sure if procedure planned today or tomrrow. Was able to clarify in context of the visit today and he does not want C-scope. Feels like it might not be neeeded. I advised reviewing with prim team and GI. In reagrds to gabapentin. MAR indicates was offered but then declined but he seems to think it was not offered. Regardless, it remains active in the MAR and hopefully he can start tonight. Still planning for [facility] for treatment once he is medically stable. Had  4 L paracentesis today    ROS:  CV, Pulm, GI and  assessed. " Pertinent positives as above, otherwise negative.     Data reviewed today: I reviewed all medications, new labs and imaging results over the last 24 hours.     Physical Exam   BP (!) 147/84 (BP Location: Left arm)   Pulse 85   Temp 98.2  F (36.8  C) (Axillary)   Resp 16   Wt 119.1 kg (262 lb 9.6 oz)   SpO2 100%   BMI 38.78 kg/m    Weight: 185 lbs 6.51 oz  Physical Exam  Constitutional:       Comments: Very jaundiced, NAD. No confusion. Speech clear and coherant   Cardiovascular:      Rate and Rhythm: Tachycardia present.      Pulses: Normal pulses.      Heart sounds: No murmur heard.  Pulmonary:      Effort: Pulmonary effort is normal. No respiratory distress.   Abdominal:      Comments: Very protuberant, NT to palpation. Remains soft   Musculoskeletal:         General: No swelling. Normal range of motion.   Skin:     General: Skin is warm.      Capillary Refill: Capillary refill takes less than 2 seconds.      Coloration: Skin is jaundiced.          Due to regulation of Title 42 of the Code of Federal Regulations (CFR) Part 2: Confidentiality laws apply to this note and the information wherein.  Thus, this note cannot be copy and pasted into any other health care staff's note nor can it be included in general medical records sent to ANY outside agency without the patient's written consent.    Not applicable

## 2023-10-06 NOTE — ED PROVIDER NOTE - SKIN, MLM
Skin normal color for race, warm, dry and intact. No evidence of rash.

## 2023-12-12 ENCOUNTER — NON-APPOINTMENT (OUTPATIENT)
Age: 38
End: 2023-12-12

## 2024-02-24 ENCOUNTER — NON-APPOINTMENT (OUTPATIENT)
Age: 39
End: 2024-02-24

## 2024-03-31 ENCOUNTER — NON-APPOINTMENT (OUTPATIENT)
Age: 39
End: 2024-03-31

## 2024-04-18 ENCOUNTER — EMERGENCY (EMERGENCY)
Facility: HOSPITAL | Age: 39
LOS: 1 days | Discharge: HOME | End: 2024-04-18
Attending: EMERGENCY MEDICINE | Admitting: EMERGENCY MEDICINE
Payer: MEDICAID

## 2024-04-18 VITALS
TEMPERATURE: 98 F | HEIGHT: 66 IN | HEART RATE: 68 BPM | SYSTOLIC BLOOD PRESSURE: 107 MMHG | RESPIRATION RATE: 16 BRPM | DIASTOLIC BLOOD PRESSURE: 70 MMHG | OXYGEN SATURATION: 99 % | WEIGHT: 162.92 LBS

## 2024-04-18 LAB
ALBUMIN SERPL ELPH-MCNC: 3.8 G/DL — SIGNIFICANT CHANGE UP (ref 3.3–5)
ALP SERPL-CCNC: 59 U/L — SIGNIFICANT CHANGE UP (ref 40–120)
ALT FLD-CCNC: 27 U/L — SIGNIFICANT CHANGE UP (ref 12–78)
ANION GAP SERPL CALC-SCNC: 5 MMOL/L — SIGNIFICANT CHANGE UP (ref 5–17)
AST SERPL-CCNC: 17 U/L — SIGNIFICANT CHANGE UP (ref 15–37)
BASOPHILS # BLD AUTO: 0.04 K/UL — SIGNIFICANT CHANGE UP (ref 0–0.2)
BASOPHILS NFR BLD AUTO: 0.5 % — SIGNIFICANT CHANGE UP (ref 0–2)
BILIRUB SERPL-MCNC: 0.4 MG/DL — SIGNIFICANT CHANGE UP (ref 0.2–1.2)
BUN SERPL-MCNC: 8 MG/DL — SIGNIFICANT CHANGE UP (ref 7–23)
CALCIUM SERPL-MCNC: 8.8 MG/DL — SIGNIFICANT CHANGE UP (ref 8.5–10.1)
CHLORIDE SERPL-SCNC: 112 MMOL/L — HIGH (ref 96–108)
CO2 SERPL-SCNC: 28 MMOL/L — SIGNIFICANT CHANGE UP (ref 22–31)
CREAT SERPL-MCNC: 0.69 MG/DL — SIGNIFICANT CHANGE UP (ref 0.5–1.3)
EGFR: 123 ML/MIN/1.73M2 — SIGNIFICANT CHANGE UP
EOSINOPHIL # BLD AUTO: 0.08 K/UL — SIGNIFICANT CHANGE UP (ref 0–0.5)
EOSINOPHIL NFR BLD AUTO: 1.1 % — SIGNIFICANT CHANGE UP (ref 0–6)
GLUCOSE SERPL-MCNC: 103 MG/DL — HIGH (ref 70–99)
HCT VFR BLD CALC: 39.7 % — SIGNIFICANT CHANGE UP (ref 39–50)
HGB BLD-MCNC: 13.6 G/DL — SIGNIFICANT CHANGE UP (ref 13–17)
IMM GRANULOCYTES NFR BLD AUTO: 0.4 % — SIGNIFICANT CHANGE UP (ref 0–0.9)
LIDOCAIN IGE QN: 77 U/L — HIGH (ref 13–75)
LYMPHOCYTES # BLD AUTO: 3.06 K/UL — SIGNIFICANT CHANGE UP (ref 1–3.3)
LYMPHOCYTES # BLD AUTO: 40.8 % — SIGNIFICANT CHANGE UP (ref 13–44)
MCHC RBC-ENTMCNC: 30.6 PG — SIGNIFICANT CHANGE UP (ref 27–34)
MCHC RBC-ENTMCNC: 34.3 GM/DL — SIGNIFICANT CHANGE UP (ref 32–36)
MCV RBC AUTO: 89.4 FL — SIGNIFICANT CHANGE UP (ref 80–100)
MONOCYTES # BLD AUTO: 0.76 K/UL — SIGNIFICANT CHANGE UP (ref 0–0.9)
MONOCYTES NFR BLD AUTO: 10.1 % — SIGNIFICANT CHANGE UP (ref 2–14)
NEUTROPHILS # BLD AUTO: 3.53 K/UL — SIGNIFICANT CHANGE UP (ref 1.8–7.4)
NEUTROPHILS NFR BLD AUTO: 47.1 % — SIGNIFICANT CHANGE UP (ref 43–77)
NRBC # BLD: 0 /100 WBCS — SIGNIFICANT CHANGE UP (ref 0–0)
PLATELET # BLD AUTO: 335 K/UL — SIGNIFICANT CHANGE UP (ref 150–400)
POTASSIUM SERPL-MCNC: 3.8 MMOL/L — SIGNIFICANT CHANGE UP (ref 3.5–5.3)
POTASSIUM SERPL-SCNC: 3.8 MMOL/L — SIGNIFICANT CHANGE UP (ref 3.5–5.3)
PROT SERPL-MCNC: 6.8 G/DL — SIGNIFICANT CHANGE UP (ref 6–8.3)
RBC # BLD: 4.44 M/UL — SIGNIFICANT CHANGE UP (ref 4.2–5.8)
RBC # FLD: 12.9 % — SIGNIFICANT CHANGE UP (ref 10.3–14.5)
SODIUM SERPL-SCNC: 145 MMOL/L — SIGNIFICANT CHANGE UP (ref 135–145)
WBC # BLD: 7.5 K/UL — SIGNIFICANT CHANGE UP (ref 3.8–10.5)
WBC # FLD AUTO: 7.5 K/UL — SIGNIFICANT CHANGE UP (ref 3.8–10.5)

## 2024-04-18 PROCEDURE — 74176 CT ABD & PELVIS W/O CONTRAST: CPT | Mod: 26,MC

## 2024-04-18 PROCEDURE — 99285 EMERGENCY DEPT VISIT HI MDM: CPT

## 2024-04-18 RX ORDER — MORPHINE SULFATE 50 MG/1
4 CAPSULE, EXTENDED RELEASE ORAL ONCE
Refills: 0 | Status: DISCONTINUED | OUTPATIENT
Start: 2024-04-18 | End: 2024-04-18

## 2024-04-18 RX ADMIN — MORPHINE SULFATE 4 MILLIGRAM(S): 50 CAPSULE, EXTENDED RELEASE ORAL at 23:00

## 2024-04-18 RX ADMIN — MORPHINE SULFATE 4 MILLIGRAM(S): 50 CAPSULE, EXTENDED RELEASE ORAL at 22:04

## 2024-04-18 NOTE — ED ADULT NURSE NOTE - OBJECTIVE STATEMENT
Patient stated  he had "right kidney pain for 5 days, went to doctor"  Ultrasound was performed. Today he was given report that there was fluid backing up in right kidney and was sent for evaluation. Denies Dysuria, but if he has to "hold my pee its very painful" Denies nausea or vomiting, denies fever or chills. Denies weakness

## 2024-04-18 NOTE — ED ADULT TRIAGE NOTE - CHIEF COMPLAINT QUOTE
pt ambulated into triage C/O right flank pain. was seen by PCP and had out patient US results showing "right sided hydronephrosis and proximal hydroureter of uncertain chronicity." denies blood in urine, or difficulty urinating. denies fevers and chills at home.

## 2024-04-18 NOTE — ED ADULT NURSE NOTE - NSFALLUNIVINTERV_ED_ALL_ED
Bed/Stretcher in lowest position, wheels locked, appropriate side rails in place/Call bell, personal items and telephone in reach/Instruct patient to call for assistance before getting out of bed/chair/stretcher/Non-slip footwear applied when patient is off stretcher/[place] to call system/Physically safe environment - no spills, clutter or unnecessary equipment/Purposeful proactive rounding/Room/bathroom lighting operational, light cord in reach

## 2024-04-19 LAB
APPEARANCE UR: CLEAR — SIGNIFICANT CHANGE UP
BILIRUB UR-MCNC: NEGATIVE — SIGNIFICANT CHANGE UP
COLOR SPEC: YELLOW — SIGNIFICANT CHANGE UP
DIFF PNL FLD: NEGATIVE — SIGNIFICANT CHANGE UP
GLUCOSE UR QL: NEGATIVE MG/DL — SIGNIFICANT CHANGE UP
KETONES UR-MCNC: NEGATIVE MG/DL — SIGNIFICANT CHANGE UP
LEUKOCYTE ESTERASE UR-ACNC: NEGATIVE — SIGNIFICANT CHANGE UP
NITRITE UR-MCNC: NEGATIVE — SIGNIFICANT CHANGE UP
PH UR: 6.5 — SIGNIFICANT CHANGE UP (ref 5–8)
PROT UR-MCNC: NEGATIVE MG/DL — SIGNIFICANT CHANGE UP
SP GR SPEC: 1.02 — SIGNIFICANT CHANGE UP (ref 1–1.03)
UROBILINOGEN FLD QL: 1 MG/DL — SIGNIFICANT CHANGE UP (ref 0.2–1)

## 2024-04-19 PROCEDURE — 85025 COMPLETE CBC W/AUTO DIFF WBC: CPT

## 2024-04-19 PROCEDURE — 36415 COLL VENOUS BLD VENIPUNCTURE: CPT

## 2024-04-19 PROCEDURE — 74176 CT ABD & PELVIS W/O CONTRAST: CPT | Mod: MC

## 2024-04-19 PROCEDURE — 87086 URINE CULTURE/COLONY COUNT: CPT

## 2024-04-19 PROCEDURE — 80053 COMPREHEN METABOLIC PANEL: CPT

## 2024-04-19 PROCEDURE — 83690 ASSAY OF LIPASE: CPT

## 2024-04-19 PROCEDURE — 99284 EMERGENCY DEPT VISIT MOD MDM: CPT | Mod: 25

## 2024-04-19 PROCEDURE — 96375 TX/PRO/DX INJ NEW DRUG ADDON: CPT

## 2024-04-19 PROCEDURE — 81003 URINALYSIS AUTO W/O SCOPE: CPT

## 2024-04-19 PROCEDURE — 96374 THER/PROPH/DIAG INJ IV PUSH: CPT

## 2024-04-19 RX ORDER — OXYCODONE HYDROCHLORIDE 5 MG/1
5 TABLET ORAL ONCE
Refills: 0 | Status: DISCONTINUED | OUTPATIENT
Start: 2024-04-19 | End: 2024-04-19

## 2024-04-19 RX ORDER — OXYCODONE HYDROCHLORIDE 5 MG/1
1 TABLET ORAL
Qty: 12 | Refills: 0
Start: 2024-04-19

## 2024-04-19 RX ORDER — KETOROLAC TROMETHAMINE 30 MG/ML
15 SYRINGE (ML) INJECTION ONCE
Refills: 0 | Status: DISCONTINUED | OUTPATIENT
Start: 2024-04-19 | End: 2024-04-19

## 2024-04-19 RX ADMIN — Medication 15 MILLIGRAM(S): at 02:18

## 2024-04-19 RX ADMIN — Medication 15 MILLIGRAM(S): at 00:53

## 2024-04-19 RX ADMIN — OXYCODONE HYDROCHLORIDE 5 MILLIGRAM(S): 5 TABLET ORAL at 02:05

## 2024-04-19 NOTE — ED PROVIDER NOTE - OBJECTIVE STATEMENT
37 y/o male with hx of Bipolar d/o, some hx of questionable kidney stone but never had radiologic evidence p/w R flank pain x 5 days, no fever, no chills, no n/v, no dysuria, no hematuria, no testicular swelling, no testicular pain  Pt sent for u/s by pmd found to have severe hydronephrosis, sent here for further evaluation

## 2024-04-19 NOTE — ED PROVIDER NOTE - NOTES
Case discussed with Dr. marc, pt can follow as outpatient as white count, creatinine, is nml Case discussed with Dr. 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

## 2024-04-19 NOTE — ED PROVIDER NOTE - CARE PROVIDER_API CALL
Earle Slater  Urology  [address], [address]  [place] [address]  Phone: [phone]  Fax: [phone]  Follow Up Time:

## 2024-04-19 NOTE — ED PROVIDER NOTE - NSFOLLOWUPINSTRUCTIONS_ED_ALL_ED_FT
Follow up with the Urologist listed below as outpatient  Return to the ER if you have fever or vomiting    Hydronephrosis    Hydronephrosis is the swelling of one or both kidneys due to a blockage that stops urine from flowing out of the body. Kidneys filter waste from the blood and produce urine. This condition can lead to kidney failure and may become life-threatening if not treated promptly.    What are the causes?  In infants and children, common causes include problems that occur when a baby is developing in the womb. These can include problems in the kidneys or in the tubes that drain urine into the bladder (ureters).    In adults, common causes include:  Kidney stones.  Pregnancy.  A tumor or cyst in the abdomen or pelvis.  An enlarged prostate gland.  Other causes include:  Bladder infection.  Scar tissue from a previous surgery or injury.  A blood clot.  Cancer of the prostate, bladder, uterus, ovary, or colon.  What are the signs or symptoms?  Symptoms of this condition include:  Pain or discomfort in your side (flank) or abdomen.  Swelling in your abdomen.  Nausea and vomiting.  Fever.  Pain when passing urine.  Feelings of urgency when you need to urinate.  Urinating more often than normal.  In some cases, you may not have any symptoms.    How is this diagnosed?  This condition may be diagnosed based on:  Your symptoms and medical history.  A physical exam.  Blood and urine tests.  Imaging tests, such as an ultrasound, CT scan, or MRI.  A procedure to look at your urinary tract and bladder by inserting a scope into the urethra (cystoscopy).  How is this treated?  Treatment for this condition depends on where the blockage is, how long it has been there, and what caused it. The goal of treatment is to remove the blockage. Treatment may include:  Antibiotic medicines to treat or prevent infection.  A procedure to place a small, thin tube (stent) into a blocked ureter. The stent will keep the ureter open so that urine can drain through it.  A nonsurgical procedure that crushes kidney stones with shock waves (extracorporeal shock wave lithotripsy).  If kidney failure occurs, treatment may include dialysis or a kidney transplant.  Follow these instructions at home:    Take over-the-counter and prescription medicines only as told by your health care provider.  If you were prescribed an antibiotic medicine, take it exactly as told by your health care provider. Do not stop taking the antibiotic even if you start to feel better.  Rest and return to your normal activities as told by your health care provider. Ask your health care provider what activities are safe for you.  Drink enough fluid to keep your urine pale yellow.  Keep all follow-up visits. This is important.  Contact a health care provider if:  You continue to have symptoms after treatment.  You develop new symptoms.  Your urine becomes cloudy or bloody.  You have a fever.  Get help right away if:  You have severe flank or abdominal pain.  You cannot drink fluids without vomiting.  Summary  Hydronephrosis is the swelling of one or both kidneys due to a blockage that stops urine from flowing out of the body.  Hydronephrosis can lead to kidney failure and may become life-threatening if not treated promptly.  The goal of treatment is to remove the blockage. It may include a procedure to insert a stent into a blocked ureter, a procedure to break up kidney stones, or taking antibiotic medicines.  Follow your health care provider's instructions for taking care of yourself at home, including instructions about drinking fluids, taking medicines, and limiting activities.  This information is not intended to replace advice given to you by your health care provider. Make sure you discuss any questions you have with your health care provider.

## 2024-04-19 NOTE — ED PROVIDER NOTE - PATIENT PORTAL LINK FT
You can access the FollowMyHealth Patient Portal offered by [facility] by registering at the following website: [email or web address]. By joining [facility]’s FollowMyHealth portal, you will also be able to view your health information using other applications (apps) compatible with our system. You can access the FollowMyHealth Patient Portal offered by [facility] by registering at the following website: [email or web address]. By joining [employer]’s FollowMyHealth portal, you will also be able to view your health information using other applications (apps) compatible with our system.

## 2024-04-19 NOTE — ED PROVIDER NOTE - CLINICAL SUMMARY MEDICAL DECISION MAKING FREE TEXT BOX
37 y/o male with hx of Bipolar d/o, some hx of questionable kidney stone but never had radiologic evidence p/w R flank pain x 5 days, no fever, no chills, no n/v, no dysuria, no hematuria, no testicular swelling, no testicular pain  Pt sent for u/s by pmd found to have severe hydronephrosis, sent here for further evaluation    Vitals reviewed, Head: atraumatic, PERRLA, EOMI, moist mucous membranes, Neck: supple, Chest: clear to ausculation, Cardiac: regular rate and rhythm, Abdomen: soft and nontender, Extremities: well perfused and without edema, Skin: intact, no visible rash, Psych: appropriate mood, Neuro: AxOx3, no focal neurologic deficit     Ddx includes but not limited to nephrolithiasis, pyelonephritis, obstructing lesion, renal insufficiency  will get cbc, cmp, u/a, urine culture, ct abd/pelvis without contrast 37 y/o male with hx of Bipolar d/o, some hx of questionable kidney stone but never had radiologic evidence p/w R flank pain x 5 days, no fever, no chills, no n/v, no dysuria, no hematuria, no testicular swelling, no testicular pain  Pt sent for u/s by pmd found to have severe hydronephrosis, sent here for further evaluation    Vitals reviewed, Head: atraumatic, PERRLA, EOMI, moist mucous membranes, Neck: supple, Chest: clear to ausculation, Cardiac: regular rate and rhythm, Abdomen: soft and nontender, Back: no CVAT, Extremities: well perfused and without edema, Skin: intact, no visible rash, Psych: appropriate mood, Neuro: AxOx3, no focal neurologic deficit     Ddx includes but not limited to nephrolithiasis, pyelonephritis, obstructing lesion, renal insufficiency  will get cbc, cmp, u/a, urine culture, ct abd/pelvis without contrast

## 2024-04-20 LAB
CULTURE RESULTS: SIGNIFICANT CHANGE UP
SPECIMEN SOURCE: SIGNIFICANT CHANGE UP

## 2024-06-10 NOTE — ED PROVIDER NOTE - DURATION
PT from home with wife with complaints of bilateral flank pain since Friday and dribbling urination. Pt notes seeing  and they told him to come in for workup. Pt lives at home with wife and ambulates independently. PT plans to get home after ED visit via wife.     
today

## 2024-11-07 ENCOUNTER — EMERGENCY (EMERGENCY)
Facility: HOSPITAL | Age: 39
LOS: 1 days | Discharge: HOME | End: 2024-11-07
Attending: STUDENT IN AN ORGANIZED HEALTH CARE EDUCATION/TRAINING PROGRAM | Admitting: STUDENT IN AN ORGANIZED HEALTH CARE EDUCATION/TRAINING PROGRAM
Payer: MEDICAID

## 2024-11-07 VITALS
HEART RATE: 77 BPM | RESPIRATION RATE: 17 BRPM | TEMPERATURE: 97 F | OXYGEN SATURATION: 99 % | SYSTOLIC BLOOD PRESSURE: 102 MMHG | WEIGHT: 177.03 LBS | DIASTOLIC BLOOD PRESSURE: 71 MMHG | HEIGHT: 66 IN

## 2024-11-07 VITALS
TEMPERATURE: 98 F | RESPIRATION RATE: 16 BRPM | OXYGEN SATURATION: 99 % | HEART RATE: 74 BPM | SYSTOLIC BLOOD PRESSURE: 110 MMHG | DIASTOLIC BLOOD PRESSURE: 70 MMHG

## 2024-11-07 LAB
ALBUMIN SERPL ELPH-MCNC: 4 G/DL — SIGNIFICANT CHANGE UP (ref 3.3–5)
ALP SERPL-CCNC: 49 U/L — SIGNIFICANT CHANGE UP (ref 40–120)
ALT FLD-CCNC: 24 U/L — SIGNIFICANT CHANGE UP (ref 12–78)
ANION GAP SERPL CALC-SCNC: 2 MMOL/L — LOW (ref 5–17)
APPEARANCE UR: CLEAR — SIGNIFICANT CHANGE UP
AST SERPL-CCNC: 14 U/L — LOW (ref 15–37)
BASOPHILS # BLD AUTO: 0.03 K/UL — SIGNIFICANT CHANGE UP (ref 0–0.2)
BASOPHILS NFR BLD AUTO: 0.5 % — SIGNIFICANT CHANGE UP (ref 0–2)
BILIRUB SERPL-MCNC: 0.4 MG/DL — SIGNIFICANT CHANGE UP (ref 0.2–1.2)
BILIRUB UR-MCNC: NEGATIVE — SIGNIFICANT CHANGE UP
BUN SERPL-MCNC: 10 MG/DL — SIGNIFICANT CHANGE UP (ref 7–23)
CALCIUM SERPL-MCNC: 9.6 MG/DL — SIGNIFICANT CHANGE UP (ref 8.5–10.1)
CHLORIDE SERPL-SCNC: 110 MMOL/L — HIGH (ref 96–108)
CO2 SERPL-SCNC: 28 MMOL/L — SIGNIFICANT CHANGE UP (ref 22–31)
COLOR SPEC: YELLOW — SIGNIFICANT CHANGE UP
CREAT SERPL-MCNC: 0.9 MG/DL — SIGNIFICANT CHANGE UP (ref 0.5–1.3)
DIFF PNL FLD: NEGATIVE — SIGNIFICANT CHANGE UP
EGFR: 113 ML/MIN/1.73M2 — SIGNIFICANT CHANGE UP
EGFR: 113 ML/MIN/1.73M2 — SIGNIFICANT CHANGE UP
EOSINOPHIL # BLD AUTO: 0.05 K/UL — SIGNIFICANT CHANGE UP (ref 0–0.5)
EOSINOPHIL NFR BLD AUTO: 0.8 % — SIGNIFICANT CHANGE UP (ref 0–6)
GLUCOSE SERPL-MCNC: 99 MG/DL — SIGNIFICANT CHANGE UP (ref 70–99)
GLUCOSE UR QL: NEGATIVE MG/DL — SIGNIFICANT CHANGE UP
HCT VFR BLD CALC: 41.8 % — SIGNIFICANT CHANGE UP (ref 39–50)
HGB BLD-MCNC: 14.3 G/DL — SIGNIFICANT CHANGE UP (ref 13–17)
IMM GRANULOCYTES NFR BLD AUTO: 0.5 % — SIGNIFICANT CHANGE UP (ref 0–0.9)
KETONES UR-MCNC: ABNORMAL MG/DL
LEUKOCYTE ESTERASE UR-ACNC: NEGATIVE — SIGNIFICANT CHANGE UP
LIDOCAIN IGE QN: 16 U/L — SIGNIFICANT CHANGE UP (ref 13–75)
LYMPHOCYTES # BLD AUTO: 1.89 K/UL — SIGNIFICANT CHANGE UP (ref 1–3.3)
LYMPHOCYTES # BLD AUTO: 32 % — SIGNIFICANT CHANGE UP (ref 13–44)
MCHC RBC-ENTMCNC: 30.6 PG — SIGNIFICANT CHANGE UP (ref 27–34)
MCHC RBC-ENTMCNC: 34.2 G/DL — SIGNIFICANT CHANGE UP (ref 32–36)
MCV RBC AUTO: 89.3 FL — SIGNIFICANT CHANGE UP (ref 80–100)
MONOCYTES # BLD AUTO: 0.6 K/UL — SIGNIFICANT CHANGE UP (ref 0–0.9)
MONOCYTES NFR BLD AUTO: 10.2 % — SIGNIFICANT CHANGE UP (ref 2–14)
NEUTROPHILS # BLD AUTO: 3.3 K/UL — SIGNIFICANT CHANGE UP (ref 1.8–7.4)
NEUTROPHILS NFR BLD AUTO: 56 % — SIGNIFICANT CHANGE UP (ref 43–77)
NITRITE UR-MCNC: NEGATIVE — SIGNIFICANT CHANGE UP
NRBC # BLD: 0 /100 WBCS — SIGNIFICANT CHANGE UP (ref 0–0)
NRBC BLD-RTO: 0 /100 WBCS — SIGNIFICANT CHANGE UP (ref 0–0)
PH UR: 6 — SIGNIFICANT CHANGE UP (ref 5–8)
PLATELET # BLD AUTO: 321 K/UL — SIGNIFICANT CHANGE UP (ref 150–400)
POTASSIUM SERPL-MCNC: 4.2 MMOL/L — SIGNIFICANT CHANGE UP (ref 3.5–5.3)
POTASSIUM SERPL-SCNC: 4.2 MMOL/L — SIGNIFICANT CHANGE UP (ref 3.5–5.3)
PROT SERPL-MCNC: 6.9 G/DL — SIGNIFICANT CHANGE UP (ref 6–8.3)
PROT UR-MCNC: SIGNIFICANT CHANGE UP MG/DL
RBC # BLD: 4.68 M/UL — SIGNIFICANT CHANGE UP (ref 4.2–5.8)
RBC # FLD: 13.2 % — SIGNIFICANT CHANGE UP (ref 10.3–14.5)
SODIUM SERPL-SCNC: 140 MMOL/L — SIGNIFICANT CHANGE UP (ref 135–145)
SP GR SPEC: 1.03 — SIGNIFICANT CHANGE UP (ref 1–1.03)
UROBILINOGEN FLD QL: 1 MG/DL — SIGNIFICANT CHANGE UP (ref 0.2–1)
WBC # BLD: 5.9 K/UL — SIGNIFICANT CHANGE UP (ref 3.8–10.5)
WBC # FLD AUTO: 5.9 K/UL — SIGNIFICANT CHANGE UP (ref 3.8–10.5)

## 2024-11-07 PROCEDURE — 99285 EMERGENCY DEPT VISIT HI MDM: CPT

## 2024-11-07 PROCEDURE — 36415 COLL VENOUS BLD VENIPUNCTURE: CPT

## 2024-11-07 PROCEDURE — 85025 COMPLETE CBC W/AUTO DIFF WBC: CPT

## 2024-11-07 PROCEDURE — 99284 EMERGENCY DEPT VISIT MOD MDM: CPT | Mod: 25

## 2024-11-07 PROCEDURE — 76700 US EXAM ABDOM COMPLETE: CPT | Mod: 26

## 2024-11-07 PROCEDURE — 76700 US EXAM ABDOM COMPLETE: CPT

## 2024-11-07 PROCEDURE — 83690 ASSAY OF LIPASE: CPT

## 2024-11-07 PROCEDURE — 96375 TX/PRO/DX INJ NEW DRUG ADDON: CPT

## 2024-11-07 PROCEDURE — 80053 COMPREHEN METABOLIC PANEL: CPT

## 2024-11-07 PROCEDURE — 96374 THER/PROPH/DIAG INJ IV PUSH: CPT

## 2024-11-07 PROCEDURE — 81003 URINALYSIS AUTO W/O SCOPE: CPT

## 2024-11-07 RX ORDER — MAGNESIUM, ALUMINUM HYDROXIDE 200-200 MG
30 TABLET,CHEWABLE ORAL ONCE
Refills: 0 | Status: COMPLETED | OUTPATIENT
Start: 2024-11-07 | End: 2024-11-07

## 2024-11-07 RX ORDER — ACETAMINOPHEN 500 MG/5ML
1000 LIQUID (ML) ORAL ONCE
Refills: 0 | Status: COMPLETED | OUTPATIENT
Start: 2024-11-07 | End: 2024-11-07

## 2024-11-07 RX ORDER — OMEPRAZOLE 20 MG/1
1 CAPSULE, DELAYED RELEASE ORAL
Qty: 14 | Refills: 0
Start: 2024-11-07 | End: 2024-11-20

## 2024-11-07 RX ADMIN — Medication 1000 MILLILITER(S): at 08:49

## 2024-11-07 RX ADMIN — Medication 400 MILLIGRAM(S): at 10:50

## 2024-11-07 RX ADMIN — Medication 30 MILLILITER(S): at 08:49

## 2024-11-07 RX ADMIN — Medication 4 MILLIGRAM(S): at 08:49

## 2024-11-07 RX ADMIN — Medication 20 MILLIGRAM(S): at 08:49

## 2024-11-07 RX ADMIN — Medication 4 MILLIGRAM(S): at 09:56

## 2024-11-21 ENCOUNTER — APPOINTMENT (OUTPATIENT)
Dept: MRI IMAGING | Facility: CLINIC | Age: 39
End: 2024-11-21

## 2024-12-18 ENCOUNTER — NON-APPOINTMENT (OUTPATIENT)
Age: 39
End: 2024-12-18

## 2025-04-05 ENCOUNTER — NON-APPOINTMENT (OUTPATIENT)
Age: 40
End: 2025-04-05

## 2025-04-10 ENCOUNTER — NON-APPOINTMENT (OUTPATIENT)
Age: 40
End: 2025-04-10

## 2025-04-11 ENCOUNTER — APPOINTMENT (OUTPATIENT)
Dept: ORTHOPEDIC SURGERY | Facility: CLINIC | Age: 40
End: 2025-04-11
Payer: MEDICAID

## 2025-04-11 DIAGNOSIS — M75.51 BURSITIS OF RIGHT SHOULDER: ICD-10-CM

## 2025-04-11 DIAGNOSIS — M79.2 NEURALGIA AND NEURITIS, UNSPECIFIED: ICD-10-CM

## 2025-04-11 PROCEDURE — 20611 DRAIN/INJ JOINT/BURSA W/US: CPT | Mod: RT

## 2025-04-11 PROCEDURE — 99203 OFFICE O/P NEW LOW 30 MIN: CPT | Mod: 25

## 2025-04-11 RX ORDER — LIDOCAINE HCL/PF 10 MG/ML
1 VIAL (ML) INJECTION
Refills: 0 | Status: COMPLETED | OUTPATIENT
Start: 2025-04-11

## 2025-04-11 RX ORDER — METHYLPRED ACET/NACL,ISO-OS/PF 40 MG/ML
40 VIAL (ML) INJECTION
Refills: 0 | Status: COMPLETED | OUTPATIENT
Start: 2025-04-11

## 2025-04-11 RX ADMIN — LIDOCAINE HYDROCHLORIDE 3 %: 10 INJECTION, SOLUTION INFILTRATION; PERINEURAL at 00:00

## 2025-04-11 RX ADMIN — METHYLPREDNISOLONE ACETATE 2 MG/ML: 40 INJECTION, SUSPENSION INTRA-ARTICULAR; INTRALESIONAL; INTRAMUSCULAR; SOFT TISSUE at 00:00

## 2025-05-09 ENCOUNTER — APPOINTMENT (OUTPATIENT)
Dept: MRI IMAGING | Facility: CLINIC | Age: 40
End: 2025-05-09
Payer: MEDICAID

## 2025-05-09 ENCOUNTER — OUTPATIENT (OUTPATIENT)
Dept: OUTPATIENT SERVICES | Facility: HOSPITAL | Age: 40
LOS: 1 days | End: 2025-05-09
Payer: MEDICAID

## 2025-05-09 DIAGNOSIS — M79.2 NEURALGIA AND NEURITIS, UNSPECIFIED: ICD-10-CM

## 2025-05-09 DIAGNOSIS — M75.51 BURSITIS OF RIGHT SHOULDER: ICD-10-CM

## 2025-05-09 PROCEDURE — 73221 MRI JOINT UPR EXTREM W/O DYE: CPT

## 2025-05-09 PROCEDURE — 73221 MRI JOINT UPR EXTREM W/O DYE: CPT | Mod: 26,RT

## 2025-05-15 ENCOUNTER — APPOINTMENT (OUTPATIENT)
Dept: ORTHOPEDIC SURGERY | Facility: CLINIC | Age: 40
End: 2025-05-15
Payer: MEDICAID

## 2025-05-15 DIAGNOSIS — M75.81 OTHER SHOULDER LESIONS, RIGHT SHOULDER: ICD-10-CM

## 2025-05-15 DIAGNOSIS — M75.51 BURSITIS OF RIGHT SHOULDER: ICD-10-CM

## 2025-05-15 PROCEDURE — 99213 OFFICE O/P EST LOW 20 MIN: CPT

## 2025-05-21 RX ORDER — MELOXICAM 15 MG/1
15 TABLET ORAL
Qty: 14 | Refills: 1 | Status: ACTIVE | COMMUNITY
Start: 2025-05-21 | End: 1900-01-01

## 2025-06-06 ENCOUNTER — APPOINTMENT (OUTPATIENT)
Dept: ORTHOPEDIC SURGERY | Facility: CLINIC | Age: 40
End: 2025-06-06
Payer: MEDICAID

## 2025-06-06 PROCEDURE — 99214 OFFICE O/P EST MOD 30 MIN: CPT

## 2025-06-16 ENCOUNTER — TRANSCRIPTION ENCOUNTER (OUTPATIENT)
Age: 40
End: 2025-06-16

## 2025-06-16 ENCOUNTER — OUTPATIENT (OUTPATIENT)
Dept: OUTPATIENT SERVICES | Facility: HOSPITAL | Age: 40
LOS: 1 days | End: 2025-06-16
Payer: MEDICAID

## 2025-06-16 VITALS
RESPIRATION RATE: 14 BRPM | WEIGHT: 167.99 LBS | OXYGEN SATURATION: 99 % | DIASTOLIC BLOOD PRESSURE: 80 MMHG | HEART RATE: 70 BPM | HEIGHT: 66 IN | TEMPERATURE: 98 F | SYSTOLIC BLOOD PRESSURE: 118 MMHG

## 2025-06-16 DIAGNOSIS — S92.309A FRACTURE OF UNSPECIFIED METATARSAL BONE(S), UNSPECIFIED FOOT, INITIAL ENCOUNTER FOR CLOSED FRACTURE: Chronic | ICD-10-CM

## 2025-06-16 DIAGNOSIS — M25.511 PAIN IN RIGHT SHOULDER: ICD-10-CM

## 2025-06-16 DIAGNOSIS — M75.81 OTHER SHOULDER LESIONS, RIGHT SHOULDER: ICD-10-CM

## 2025-06-16 DIAGNOSIS — M75.51 BURSITIS OF RIGHT SHOULDER: ICD-10-CM

## 2025-06-16 DIAGNOSIS — Z98.890 OTHER SPECIFIED POSTPROCEDURAL STATES: Chronic | ICD-10-CM

## 2025-06-16 DIAGNOSIS — Z01.818 ENCOUNTER FOR OTHER PREPROCEDURAL EXAMINATION: ICD-10-CM

## 2025-06-16 DIAGNOSIS — S69.90XA UNSPECIFIED INJURY OF UNSPECIFIED WRIST, HAND AND FINGER(S), INITIAL ENCOUNTER: Chronic | ICD-10-CM

## 2025-06-16 PROCEDURE — G0463: CPT

## 2025-06-16 NOTE — H&P PST ADULT - NEUROLOGICAL SYMPTOMS
Dentist options - [facility]ist - Dr. Zeeshan Lynn or see print out    Mental health care - [facility], [facility]    Cologuard Patient Instructions    You received an order for a Cologuard test. You will receive your kit in the mail. Please follow the instructions that are provided in the kit.      Reminder: Ship Cologuard test the same day or the next day to allow enough delivery time. The lab must receive your specimen within 4 days for successful testing. If not delivered in time, you may have to complete the process again.    Home Pick-up:  Once you complete the kit, call [employer] at [phone] and they will schedule a UPS pickup for you.    OR    Drop Off Locations:  Once you have completed the collection and have it ready to be shipped, bring it to one of the following sites listed below. *Note: none of the sites ship out later than mid-morning. Please do not drop off Fridays, as they will not go out until Monday which will make your specimen inacceptable.     - [facility] ([address], [place] [address])      Drop off: Monday-Thursday, 8:00am-4:30pm at the [address] check-in desk      - [employer] [facility] ([address], [place] [address])   Drop off: Monday-Thursday, 8:00am-5:45pm     - [employer] ([address], [place] [address])     Drop off: Monday-Thursday, 3:00pm-5:30pm     Lung Cancer Screening   Frequently Asked Questions  If you are at high-risk for lung cancer, getting screened with low-dose computed tomography (LDCT) every year can help save your life. This handout offers answers to some of the most common questions about lung cancer screening. If you have other questions, please call [phone] ([phone]).     What is it?  Lung cancer screening uses special X-ray technology to create an image of your lung tissue. The exam is quick and easy and takes less than 10 seconds. We don t give you any medicine or use any needles.  You can eat before and after the exam. You don t need to change your clothes as long as the clothing on your chest doesn t contain metal. But, you do need to be able to hold your breath for at least 6 seconds during the exam.    What is the goal of lung cancer screening?  The goal of lung cancer screening is to save lives. Many times, lung cancer is not found until a person starts having physical symptoms. Lung cancer screening can help detect lung cancer in the earliest stages when it may be easier to treat.    Who should be screened for lung cancer?  We suggest lung cancer screening for anyone who is at high-risk for lung cancer. You are in the high-risk group if you:     are between the ages of 55 and 79, and   have smoked at least 1 pack of cigarettes a day for 20 or more years, and   still smoke or have quit within the past 15 years.    However, if you have a new cough or shortness of breath, you should talk to your doctor before being screened.    Why does it matter if I have symptoms?  Certain symptoms can be a sign that you have a condition in your lungs that should be checked and treated by your doctor. These symptoms include fever, chest pain, a new or changing cough, shortness of breath that you have never felt before, coughing up blood or unexplained weight loss. Having any of these symptoms can greatly affect the results of lung cancer screening.       Should all smokers get an LDCT lung cancer screening exam?  It depends. Lung cancer screening is for a very specific group of men and women who have a history of heavy smoking over a long period of time (see  Who should be screened for lung cancer  above).  I am in the high-risk group, but have been diagnosed with cancer in the past. Is LDCT lung cancer screening right for me?  In some cases, you should not have LDCT lung screening, such as when your doctor is already following your cancer with CT scan studies. Your doctor will help you decide if LDCT  lung screening is right for you.  Do I need to have a screening exam every year?  Yes. If you are in the high-risk group described earlier, you should get an LDCT lung cancer screening exam every year until you are 79, or are no longer willing or able to undergo screening and possible procedures to diagnose and treat lung cancer.  How effective is LDCT at preventing death from lung cancer?  Studies have shown that LDCT lung cancer screening can lower the risk of death from lung cancer by 20 percent in people who are at high-risk.  What are the risks?  There are some risks and limitations of LDCT lung cancer screening. We want to make sure you understand the risks and benefits, so please let us know if you have any questions. Your doctor may want to talk with you more about these risks.   Radiation exposure: As with any exam that uses radiation, there is a very small increased risk of cancer. The amount of radiation in LDCT is small--about the same amount a person would get from a mammogram. Your doctor orders the exam when he or she feels the potential benefits outweigh the risks.   False negatives: No test is perfect, including LDCT. It is possible that you may have a medical condition, including lung cancer, that is not found during your exam. This is called a false negative result.   False positives and more testing: LDCT very often finds something in the lung that could be cancer, but in fact is not. This is called a false positive result. False positive tests often cause anxiety. To make sure these findings are not cancer, you may need to have more tests. These tests will be done only if you give us permission. Sometimes patients need a treatment that can have side effects, such as a biopsy. For more information on false positives, see  What can I expect from the results?    Findings not related to lung cancer: Your LDCT exam also takes pictures of areas of your body next to your lungs. In a very small number  of cases, the CT scan will show an abnormal finding in one of these areas, such as your kidneys, adrenal glands, liver or thyroid. This finding may not be serious, but you may need more tests. Your doctor can help you decide what other tests you may need, if any.  What can I expect from the results?  About 1 out of 4 LDCT exams will find something that may need more tests. Most of the time, these findings are lung nodules. Lung nodules are very small collections of tissue in the lung. These nodules are very common, and the vast majority--more than 97 percent--are not cancer (benign). Most are normal lymph nodes or small areas of scarring from past infections.  But, if a small lung nodule is found to be cancer, the cancer can be cured more than 90 percent of the time. To know if the nodule is cancer, we may need to get more images before your next yearly screening exam. If the nodule has suspicious features (for example, it is large, has an odd shape or grows over time), we will refer you to a specialist for further testing.  Will my doctor also get the results?  Yes. Your doctor will get a copy of your results.  Is it okay to keep smoking now that there s a cancer screening exam?  No. Tobacco is one of the strongest cancer-causing agents. It causes not only lung cancer, but other cancers and cardiovascular (heart) diseases as well. The damage caused by smoking builds over time. This means that the longer you smoke, the higher your risk of disease. While it is never too late to quit, the sooner you quit, the better.  Where can I find help to quit smoking?  The best way to prevent lung cancer is to stop smoking. If you have already quit smoking, congratulations and keep it up! For help on quitting smoking, please call [facility] at 8-824-QUITNOW (1-869.495.5423) or the American [employer] Society at 1-551.328.7367 to find local resources near you.  One-on-one health coaching:  If you d prefer to work individually with a  health care provider on tobacco cessation, we offer:     Medication Therapy Management:  Our specially trained pharmacists work closely with you and your doctor to help you quit smoking.  Call [phone] or [phone] (toll free).     syncope several months ago . cardiac eval was normal

## 2025-06-16 NOTE — H&P PST ADULT - HISTORY OF PRESENT ILLNESS
40 y/o male who presents with right shoulder pain and pain with ROM for the past 3 months He is noting improvement in his range of motion and pain level but still has complaints of clicking and popping and pain with activities of the right shoulder. Pain level 7 out of 10.  Pain is worse when he works out in the gym as well. Pain is improved with rest. scheduled for right shoulder arthroscopy 6/27/25  ?

## 2025-06-16 NOTE — H&P PST ADULT - NSICDXPASTSURGICALHX_GEN_ALL_CORE_FT
PAST SURGICAL HISTORY:  History of pyeloplasty      PAST SURGICAL HISTORY:  Finger injury     [employer]d metatarsal bone     History of pyeloplasty

## 2025-06-16 NOTE — H&P PST ADULT - NSANTHOSAYNRD_GEN_A_CORE
No. TERRANCE screening performed.  STOP BANG Legend: 0-2 = LOW Risk; 3-4 = INTERMEDIATE Risk; 5-8 = HIGH Risk

## 2025-06-27 ENCOUNTER — APPOINTMENT (OUTPATIENT)
Dept: ORTHOPEDIC SURGERY | Facility: HOSPITAL | Age: 40
End: 2025-06-27

## 2025-06-27 ENCOUNTER — TRANSCRIPTION ENCOUNTER (OUTPATIENT)
Age: 40
End: 2025-06-27

## 2025-06-27 ENCOUNTER — OUTPATIENT (OUTPATIENT)
Dept: OUTPATIENT SERVICES | Facility: HOSPITAL | Age: 40
LOS: 1 days | End: 2025-06-27
Payer: MEDICAID

## 2025-06-27 VITALS
HEART RATE: 49 BPM | SYSTOLIC BLOOD PRESSURE: 92 MMHG | TEMPERATURE: 98 F | WEIGHT: 168.43 LBS | RESPIRATION RATE: 20 BRPM | HEIGHT: 66 IN | DIASTOLIC BLOOD PRESSURE: 69 MMHG | OXYGEN SATURATION: 99 %

## 2025-06-27 VITALS
OXYGEN SATURATION: 99 % | TEMPERATURE: 98 F | SYSTOLIC BLOOD PRESSURE: 100 MMHG | RESPIRATION RATE: 16 BRPM | DIASTOLIC BLOOD PRESSURE: 75 MMHG | HEART RATE: 70 BPM

## 2025-06-27 DIAGNOSIS — S69.90XA UNSPECIFIED INJURY OF UNSPECIFIED WRIST, HAND AND FINGER(S), INITIAL ENCOUNTER: Chronic | ICD-10-CM

## 2025-06-27 DIAGNOSIS — Z98.890 OTHER SPECIFIED POSTPROCEDURAL STATES: Chronic | ICD-10-CM

## 2025-06-27 DIAGNOSIS — M75.51 BURSITIS OF RIGHT SHOULDER: ICD-10-CM

## 2025-06-27 DIAGNOSIS — M75.81 OTHER SHOULDER LESIONS, RIGHT SHOULDER: ICD-10-CM

## 2025-06-27 DIAGNOSIS — S92.309A FRACTURE OF UNSPECIFIED METATARSAL BONE(S), UNSPECIFIED FOOT, INITIAL ENCOUNTER FOR CLOSED FRACTURE: Chronic | ICD-10-CM

## 2025-06-27 PROCEDURE — ZZZZZ: CPT

## 2025-06-27 PROCEDURE — 29823 SHO ARTHRS SRG XTNSV DBRDMT: CPT | Mod: RT

## 2025-06-27 PROCEDURE — 29826 SHO ARTHRS SRG DECOMPRESSION: CPT | Mod: RT

## 2025-06-27 RX ORDER — HYDROMORPHONE/SOD CHLOR,ISO/PF 2 MG/10 ML
0.5 SYRINGE (ML) INJECTION
Refills: 0 | Status: DISCONTINUED | OUTPATIENT
Start: 2025-06-27 | End: 2025-06-27

## 2025-06-27 RX ORDER — TRAZODONE HCL 100 MG
0 TABLET ORAL
Refills: 0 | DISCHARGE

## 2025-06-27 RX ORDER — SODIUM CHLORIDE 9 G/1000ML
1000 INJECTION, SOLUTION INTRAVENOUS
Refills: 0 | Status: DISCONTINUED | OUTPATIENT
Start: 2025-06-27 | End: 2025-06-27

## 2025-06-27 RX ORDER — CEFAZOLIN SODIUM IN 0.9 % NACL 3 G/100 ML
2000 INTRAVENOUS SOLUTION, PIGGYBACK (ML) INTRAVENOUS ONCE
Refills: 0 | Status: COMPLETED | OUTPATIENT
Start: 2025-06-27 | End: 2025-06-27

## 2025-06-27 RX ORDER — OXYCODONE HYDROCHLORIDE 30 MG/1
5 TABLET ORAL ONCE
Refills: 0 | Status: DISCONTINUED | OUTPATIENT
Start: 2025-06-27 | End: 2025-06-27

## 2025-06-27 RX ORDER — CYCLOBENZAPRINE HYDROCHLORIDE 15 MG/1
1 CAPSULE, EXTENDED RELEASE ORAL
Refills: 0 | DISCHARGE

## 2025-06-27 RX ORDER — OXYCODONE HYDROCHLORIDE AND ACETAMINOPHEN 10; 325 MG/1; MG/1
1 TABLET ORAL
Qty: 30 | Refills: 0
Start: 2025-06-27 | End: 2025-07-01

## 2025-06-27 RX ORDER — ONDANSETRON HCL/PF 4 MG/2 ML
4 VIAL (ML) INJECTION ONCE
Refills: 0 | Status: DISCONTINUED | OUTPATIENT
Start: 2025-06-27 | End: 2025-06-27

## 2025-06-27 RX ADMIN — SODIUM CHLORIDE 75 MILLILITER(S): 9 INJECTION, SOLUTION INTRAVENOUS at 09:08

## 2025-06-27 RX ADMIN — Medication 1 APPLICATION(S): at 06:42

## 2025-06-27 NOTE — ASU DISCHARGE PLAN (ADULT/PEDIATRIC) - NS MD DC FALL RISK RISK
For information on Fall & Injury Prevention, visit: [email or web address] OR  [email or web address] OR  https://www.cdc.gov/steadi/patient.html

## 2025-06-27 NOTE — ASU DISCHARGE PLAN (ADULT/PEDIATRIC) - ASU DC SPECIAL INSTRUCTIONSFT
FOLLOW enclosed shoulder arthroscopy brochure.  Call MD for severe pain/fever/chills.  In 48 hours remove primary dressing at place bandaids over suture areas.  Apply clean guaze if areas are still draining.  Ice to shoulder 20 minutes on and off the first 48 hours after surgery to help decrease pain/swelling  Keep shoulder elevated in sling at all times.  Keep your shoulder close to your body and do not externally rotate shoulder in order to preserve rotator cuff repair.  No heavy lifting/pushing/pulling/twisting/weight bearing with shoulder.  Pump fists 10x an hour to help with circulation.  Your most comfortable sleeping position will be propped up.  Pain medication as needed, take with food & a stool softener. FOLLOW enclosed shoulder arthroscopy brochure.  Call MD for severe pain/fever/chills.  In 48 hours remove primary dressing at place bandaids over suture areas.  Apply clean guaze if areas are still draining.  Ice to shoulder 20 minutes on and off the first 48 hours after surgery to help decrease pain/swelling  Keep shoulder elevated in sling while out of the house.  May remove while at home and awake.  Flex and extend elbow to prevent stiffness when out of sling.  Pump fists 10x an hour to help with circulation.  Your most comfortable sleeping position will be propped up.  Pain medication as needed.

## 2025-06-27 NOTE — ASU DISCHARGE PLAN (ADULT/PEDIATRIC) - FINANCIAL ASSISTANCE
[facility] provides services at a reduced cost to those who are determined to be eligible through [facility]’s financial assistance program. Information regarding [facility]’s financial assistance program can be found by going to [email or web address] or by calling [phone].

## 2025-06-27 NOTE — ASU DISCHARGE PLAN (ADULT/PEDIATRIC) - CARE PROVIDER_API CALL
Tom Amaral  Orthopaedic Surgery  [address]  [place] [address]  Phone: [phone]  Fax: [phone]  Follow Up Time:

## 2025-06-27 NOTE — ASU PATIENT PROFILE, ADULT - FALL HARM RISK - UNIVERSAL INTERVENTIONS
Bed in lowest position, wheels locked, appropriate side rails in place/Call bell, personal items and telephone in reach/Instruct patient to call for assistance before getting out of bed or chair/Non-slip footwear when patient is out of bed/[place] to call system/Physically safe environment - no spills, clutter or unnecessary equipment/Purposeful Proactive Rounding/Room/bathroom lighting operational, light cord in reach

## 2025-06-27 NOTE — ASU PREOP CHECKLIST - AS TEMP SITE
oral Dietitian consultation to discuss options for potential p.o. supplementation and/or family education on disease process

## 2025-06-27 NOTE — ASU DISCHARGE PLAN (ADULT/PEDIATRIC) - PROVIDER TOKENS
PROVIDER:[TOKEN:[[id number]]] Humira Counseling:  I discussed with the patient the risks of adalimumab including but not limited to myelosuppression, immunosuppression, autoimmune hepatitis, demyelinating diseases, lymphoma, and serious infections.  The patient understands that monitoring is required including a PPD at baseline and must alert us or the primary physician if symptoms of infection or other concerning signs are noted.

## 2025-07-08 ENCOUNTER — APPOINTMENT (OUTPATIENT)
Dept: ORTHOPEDIC SURGERY | Facility: CLINIC | Age: 40
End: 2025-07-08
Payer: MEDICAID

## 2025-07-08 PROCEDURE — 99024 POSTOP FOLLOW-UP VISIT: CPT

## 2025-07-08 PROCEDURE — 73030 X-RAY EXAM OF SHOULDER: CPT | Mod: RT

## 2025-08-08 ENCOUNTER — APPOINTMENT (OUTPATIENT)
Dept: ORTHOPEDIC SURGERY | Facility: CLINIC | Age: 40
End: 2025-08-08
Payer: MEDICAID

## 2025-08-08 DIAGNOSIS — Z98.890 OTHER SPECIFIED POSTPROCEDURAL STATES: ICD-10-CM

## 2025-08-08 PROCEDURE — 99024 POSTOP FOLLOW-UP VISIT: CPT

## 2025-09-04 ENCOUNTER — APPOINTMENT (OUTPATIENT)
Dept: ORTHOPEDIC SURGERY | Facility: CLINIC | Age: 40
End: 2025-09-04
Payer: MEDICAID

## 2025-09-04 DIAGNOSIS — Z98.890 OTHER SPECIFIED POSTPROCEDURAL STATES: ICD-10-CM

## 2025-09-04 PROCEDURE — 99024 POSTOP FOLLOW-UP VISIT: CPT

## (undated) DEVICE — DVC SUCTION FLR PUDDLE GUPPY

## (undated) DEVICE — VENODYNE/SCD SLEEVE CALF MEDIUM

## (undated) DEVICE — DRAPE TOP SHEET 53" X 101"

## (undated) DEVICE — DRAPE SPLIT SHEET 77" X 108"

## (undated) DEVICE — POSITIONER POSITIONING ROLL  5X17"

## (undated) DEVICE — POSITIONER STRAP ARMBOARD VELCRO TS-30

## (undated) DEVICE — TUBING DEPUY MITEK FMS VUE INFLOW

## (undated) DEVICE — NDL HYPO SAFE 22G X 1.5" (BLACK)

## (undated) DEVICE — BAG SPONGE COUNTER EZ

## (undated) DEVICE — DRSG MASTISOL

## (undated) DEVICE — SHAVER BLADE GATOR 4.8MM

## (undated) DEVICE — SLING SHOULDER ABDUCTION LARGE

## (undated) DEVICE — ELCTR VAPR COOL PULSE

## (undated) DEVICE — SUT MONOCRYL 3-0 27" PS-2 UNDYED

## (undated) DEVICE — SOL IRR BAG NS 0.9% 3000ML

## (undated) DEVICE — GLV 8 PROTEXIS (WHITE)

## (undated) DEVICE — CANNULA ARTHREX TWIST IN NO SQUIRT CAP 7X7 PURPLE

## (undated) DEVICE — BOOT COVER NONSKID IMPERVIOUS

## (undated) DEVICE — SYR LUER LOK 10CC

## (undated) DEVICE — TUBING SUCTION 20FT

## (undated) DEVICE — SYR LUER LOK 50CC

## (undated) DEVICE — CANNULA ARTHREX TWIST IN 6MMX7CM

## (undated) DEVICE — GLV 7.5 PROTEXIS (WHITE)

## (undated) DEVICE — PACK SHOULDER ARTHROSCOPY

## (undated) DEVICE — WARMING BLANKET LOWER ADULT

## (undated) DEVICE — DRAPE IOBAN 23" X 23"

## (undated) DEVICE — DRSG RESTRAINT LIMB WRAP AROUND

## (undated) DEVICE — BUR LINVATEC OVAL 5MM X 13CM

## (undated) DEVICE — DRSG STERISTRIPS 0.5 X 4"

## (undated) DEVICE — TUBING DEPUY MITEK FMS OUTFLOW

## (undated) DEVICE — DRAPE 3/4 SHEET 52X76"

## (undated) DEVICE — SUT LASSO 90 DEGREE CURVE STRAIGHT

## (undated) DEVICE — CANNULA LINVATEC SHOULDER 7CM (GREY & ORANGE)

## (undated) DEVICE — NDL SPINAL 18G X 3.5" (PINK)

## (undated) DEVICE — POSITIONER FOAM HEAD CRADLE (PINK)

## (undated) DEVICE — POSITIONER S&N FACE MASK SPIDER 2

## (undated) DEVICE — POSITIONER STIRRUP STRAP W SLIP RING 19X3.5"